# Patient Record
Sex: MALE | Race: WHITE | NOT HISPANIC OR LATINO | Employment: OTHER | URBAN - METROPOLITAN AREA
[De-identification: names, ages, dates, MRNs, and addresses within clinical notes are randomized per-mention and may not be internally consistent; named-entity substitution may affect disease eponyms.]

---

## 2017-10-06 ENCOUNTER — OFFICE VISIT (OUTPATIENT)
Dept: CARDIOLOGY | Facility: CLINIC | Age: 47
End: 2017-10-06
Payer: COMMERCIAL

## 2017-10-06 VITALS
HEART RATE: 85 BPM | DIASTOLIC BLOOD PRESSURE: 74 MMHG | WEIGHT: 232.38 LBS | BODY MASS INDEX: 33.27 KG/M2 | SYSTOLIC BLOOD PRESSURE: 113 MMHG | HEIGHT: 70 IN

## 2017-10-06 DIAGNOSIS — E78.00 HYPERCHOLESTEROLEMIA: ICD-10-CM

## 2017-10-06 DIAGNOSIS — I10 ESSENTIAL HYPERTENSION: Primary | ICD-10-CM

## 2017-10-06 DIAGNOSIS — Z72.0 TOBACCO USE: ICD-10-CM

## 2017-10-06 DIAGNOSIS — I10 ESSENTIAL HYPERTENSION: ICD-10-CM

## 2017-10-06 DIAGNOSIS — E11.9 TYPE 2 DIABETES MELLITUS WITHOUT COMPLICATION, WITHOUT LONG-TERM CURRENT USE OF INSULIN: ICD-10-CM

## 2017-10-06 DIAGNOSIS — R06.09 DOE (DYSPNEA ON EXERTION): ICD-10-CM

## 2017-10-06 DIAGNOSIS — I51.89 DIASTOLIC DYSFUNCTION: ICD-10-CM

## 2017-10-06 DIAGNOSIS — Z82.49 FAMILY HISTORY OF EARLY CAD: ICD-10-CM

## 2017-10-06 PROCEDURE — 99204 OFFICE O/P NEW MOD 45 MIN: CPT | Mod: S$GLB,,, | Performed by: INTERNAL MEDICINE

## 2017-10-06 PROCEDURE — 99999 PR PBB SHADOW E&M-EST. PATIENT-LVL IV: CPT | Mod: PBBFAC,,, | Performed by: INTERNAL MEDICINE

## 2017-10-06 RX ORDER — OLMESARTAN MEDOXOMIL 20 MG/1
20 TABLET ORAL DAILY
Qty: 90 TABLET | Refills: 0 | Status: SHIPPED | OUTPATIENT
Start: 2017-10-06 | End: 2017-12-08 | Stop reason: SDUPTHER

## 2017-10-06 RX ORDER — PANTOPRAZOLE SODIUM 40 MG/1
40 TABLET, DELAYED RELEASE ORAL DAILY PRN
Qty: 90 TABLET | Refills: 1 | OUTPATIENT
Start: 2017-10-06

## 2017-10-06 RX ORDER — PITAVASTATIN CALCIUM 4.18 MG/1
4 TABLET, FILM COATED ORAL DAILY
Qty: 90 TABLET | Refills: 0 | Status: SHIPPED | OUTPATIENT
Start: 2017-10-06 | End: 2017-12-08 | Stop reason: SDUPTHER

## 2017-10-06 RX ORDER — ICOSAPENT ETHYL 1000 MG/1
2 CAPSULE ORAL 2 TIMES DAILY
Qty: 180 CAPSULE | Refills: 0 | Status: SHIPPED | OUTPATIENT
Start: 2017-10-06 | End: 2017-10-26 | Stop reason: SDUPTHER

## 2017-10-06 RX ORDER — OLMESARTAN MEDOXOMIL 20 MG/1
20 TABLET ORAL DAILY
Qty: 90 TABLET | Refills: 0 | Status: SHIPPED | OUTPATIENT
Start: 2017-10-06 | End: 2017-10-06 | Stop reason: SDUPTHER

## 2017-10-06 RX ORDER — EZETIMIBE 10 MG/1
10 TABLET ORAL DAILY
Qty: 90 TABLET | Refills: 0 | Status: SHIPPED | OUTPATIENT
Start: 2017-10-06 | End: 2017-11-05 | Stop reason: SDUPTHER

## 2017-10-06 NOTE — PATIENT INSTRUCTIONS
"  Exercise to Manage Your Blood Sugar    Being physically active every day can help you manage your blood sugar. Thats because an active lifestyle can improve your bodys ability to use insulin. Daily activity can also help delay or prevent complications of diabetes. And its a great way to relieve stress. If you arent normally active, be sure to consult your healthcare provider before getting started.  How much activity do you need?  If daily activity is new to you, start slow and steady. Begin with 10 minutes of activity each day. Then work up to at least 150 minutes a week of physical activity. Don't let more than 2 days go by without being active. When sitting for long periods of time, get up for short sessions of light activity every 30 minutes  Just move!  You dont have to join a gym or own pricey sports equipment. Just get out and walk. Walking is an aerobic exercise that makes your heart and lungs work hard. It helps your heart and blood vessels. Walking needs only a sturdy pair of sneakers and your own two feet. The more you walk, the easier it gets:  · Schedule time every day to move your feet.  · Make it part of your daily routine.  · Walk with a friend or a group to keep it interesting and fun.  · Try taking several short walks during the day to meet your daily activity goal.  A pedometer makes every step count  A pedometer is a small device that keeps track of how many steps you take. You can clip it to your belt (or a strap on your arm or leg) and go about your daily routine. "Smartphones" now also have apps to record your walking. At the end of the day, the pedometer shows the total number of steps you took. Use a pedometer to set daily goals for yourself. For instance, if you walk 4,000 steps a day, try adding 200 more steps each day. Aim for a goal of 7,500. With every step, youre doing a little more to help your body use insulin.   Adding resistance exercise  Resistance exercise (also called " strength training), makes muscles stronger. It also helps muscles use insulin better. Ask your healthcare provider whether this type of exercise is right for you. If it is, your healthcare provider can help you work it in to your activity plan.  Staying safe  Being active may cause blood sugar to drop faster than usual. This is especially true if you take medicine to manage your blood sugar. But there are things you can do to help reduce the risk of accidental lows. Keep these tips in mind:  · Always carry identification when you exercise outside your home. Carry a cell phone to use in case of emergency.  · If you can, include friends and family in your activities.  · Wear a medical ID bracelet that says you have diabetes.  · Use the right safety equipment for the activity you do (such as a bicycle helmet when you ride a bicycle outdoors). Wear closed-toed shoes that fit your feet well.  · Drink plenty of water before and during activity.  · Keep a fast-acting sugar (such as glucose tablets) on hand in case of low blood sugar.  · Dress properly for the weather. Wear a hat if its [place], or wait until evening if its too hot.  · Avoid being active for long periods in very hot or very cold weather.  · Skip activity if youre sick.     Notice how activity affects blood sugar  Physical activity is important when you have diabetes. But you need to keep an eye on your blood sugar level. Check often if you have been active for longer than usual, or if the activity was unplanned. Make it a habit to check your blood sugar before being active. And check again a few hours later. Use your log book to write down how activity affects your numbers. If you take insulin, you may be able to adjust your dose before a planned activity. This can help prevent lows. You may also need to take a small carbohydrate snack before the exercise. Talk to your healthcare provider to learn more.    Date Last Reviewed: 6/1/2016  © 5019-1762 [employer]. [address] [place] [address], [place] [address]. All rights reserved. This information is not intended as a substitute for professional medical care. Always follow your healthcare professional's instructions.        Diabetes and Heart Disease     Take your medicines as directed each day, even if you feel fine.   If you have diabetes, you are two to four times more likely to have heart disease than someone without diabetes. This higher risk is due to diabetes, but it is also due to other risk factors for heart disease that happen in people with diabetes. But theres good news. You can help control your health risks by making some changes in your life. You can take steps to reduce your risk of heart disease by half--similar to the risk in people who don't have diabetes.  Your main risk factors  Three major risk factors for heart disease are high blood sugar, high blood pressure, and high levels of lipids. By keeping risk factors under control, you can help keep your heart and arteries healthy. This may reduce your chances of a heart attack.  · Blood sugar. High blood sugar can make artery walls tough and rough. Plaque (waxy material in the blood) can then build up along the artery walls, making it harder for blood to flow through the arteries. Having high blood sugar increases the chances of having high blood pressure and high cholesterol.  · Blood pressure. When blood pressure is high all the time it causes your heart to work harder to pump blood. Artery walls become damaged. This increases the risk for plaque build up.  · Lipids. The body needs some lipids in the blood to stay healthy. But lipid levels that are too high can damage the artery walls. Lipids include cholesterol and triglycerides. There are two kinds of cholesterol. LDL (bad) cholesterol can damage the arteries. But HDL (good) cholesterol helps clear LDL cholesterol from the blood vessels. This helps keep the arteries healthy. When  blood sugar is high, the level of triglycerides in the blood may also be high. High blood triglyceride levels can cause plaque to form.   Other risk factors  Certain lifestyle factors can increase levels of your blood sugar, blood pressure, and lipids. Such increases raise your risk of heart disease:  · Smoking damages the lining of your arteries. This allows plaque to build up in the artery walls. Smoking also constricts (narrows) the arteries. This can raise blood pressure and cause chest pain or angina. Smoking also increases your risk of getting type 2 diabetes.  · Not being active makes it harder for your heart to do its work. Inactivity is linked to many other risk factors, such as high blood pressure and poor cholesterol levels. Inactivity also increases your risk of getting type 2 diabetes.  · Being overweight makes it harder for your body to use insulin. It also makes your heart work too hard. Being overweight is also the main contributor to the development of type 2 diabetes,   Changes you can make  Following a few simple steps can help keep your risk factors under control. Work with your healthcare team to reach your goals.  · Quitting smoking could save your life. Smoking damages the lining of the blood vessels and raises blood pressure. Smoking also affects how your body uses insulin. This makes it harder to keep blood sugar under control. If you smoke and need help quitting, talk to your healthcare team.   · Testing your blood sugar is the only way to know whether it is under control. Be sure to test your blood sugar yourself. Also get your blood tested in the lab, as directed.  · Monitoring your blood pressure and lipid levels can help you achieve safe levels. Visit your healthcare team as scheduled.  · Taking medicines as directed can help control blood sugar, blood pressure, blood clotting, and/or cholesterol levels.  · Eating right can reduce your risk factors and help you lose weight. Try to limit  the amount of processed or refined carbohydrates you eat at one time. Cut back on your total calorie intake. Eat foods low in saturated fat and cholesterol. Eat fiber, including vegetables and whole grains, and cut down on salt. A dietitian or diabetes educator can help form a meal plan that works for you--even if you are on a low budget.   · Being active can help reduce your weight, strengthen your heart, and lower your lipid levels and blood pressure. Exercise and activity are good for your whole body. Talk to your healthcare team about increasing your activity safely over time.  · Keeping your appointments with your healthcare provider helps you stay healthy. Go in for checkups and lab tests as scheduled.  Date Last Reviewed: 5/19/2016 © 2000-2017 [employer]. [address] [place] [address], [place] [address]. All rights reserved. This information is not intended as a substitute for professional medical care. Always follow your healthcare professional's instructions.        Diabetes: The Benefits of Exercise     Take the stairs whenever you can.   Exercise can lower blood sugar, help control weight, and boost your mood. It can also improve blood flow, lower blood pressure, help you use oxygen more efficiently, and improve heart health. Even a small amount of regular activity can have a big impact on your health.  What can exercise help?  · Blood sugar. Regular exercise improves blood sugar control by helping your body use insulin.  · Mental and emotional health. Physical activity relieves stress and helps you sleep better.  · Heart health. With regular exercise, you can reduce your risk of heart disease and high blood pressure. You can also improve your cholesterol and triglyceride levels.  · Weight. Exercise helps you lose fat, gain muscle, and control your weight.  · Health of blood vessels and nerves. Activity helps lower blood sugar. This helps prevent damage to blood vessels and nerves that can cause  problems with your brain, eyes, feet, and legs.  · Finances. If you manage your blood sugar, you may spend less on medical care.  2 types of exercise  Two types of exercise help your body use blood sugar. Experts advise both types of exercise for people with diabetes:  · Aerobic exercise. This is a rhythmic, repeated, continued movement of large muscle groups for at least 10 minutes at a time. You should do this about 30 minutes a day on most days of the week. Examples include walking, bicycling, jogging, swimming, water aerobics, and many sports.  · Resistance exercise (strength training). This type of exercise uses muscles to move weight or work against resistance. You can do it with free weights, machines, resistance tubing, or your own body weight. Adults with diabetes should aim for 2 to 3 sessions of resistance exercise each week. Its best to skip a day in between.  A goal to shoot for  Your main goal is to become more active. Even a little bit helps. Choose an activity that you like. Walking is one great form of exercise that everyone can do. Talk to your healthcare provider about any limits you may have before starting with an exercise program. Then aim for 150 minutes a week of physical activity. Dont let more than 2 days go by without exercise. When you are sitting for long periods of time, get up for short sessions of light activity every 30 minutes.  Getting activity into your day  Being more active doesnt have to be hard work. Try these to get more activity into your day:  · Take the stairs instead of the elevator  · Garden, do housework, and yard work  · Choose a parking space farther from the store  · Walk to talk to a co-worker instead of calling  · Take a 10-minute walk around the block at lunch  · Walk to a bus stop a little farther from your home or office  · Walk the dog after dinner  Date Last Reviewed: 5/1/2016  © 0348-7385 [employer]. [address] [place] [address], [place]  [address]. All rights reserved. This information is not intended as a substitute for professional medical care. Always follow your healthcare professional's instructions.        Controlling High Blood Pressure  High blood pressure (hypertension) is often called the silent killer. This is because many people who have it dont know it. High blood pressure is defined as 140/90 mm Hg or higher. Know your blood pressure and remember to check it regularly. Doing so can save your life. Here are some things you can do to help control your blood pressure.    Choose heart-healthy foods  · Select low-salt, low-fat foods. Limit sodium intake to 2,400 mg per day or the amount suggested by your healthcare provider.  · Limit canned, dried, cured, packaged, and fast foods. These can contain a lot of salt.  · Eat 8 to 10 servings of fruits and vegetables every day.  · Choose lean meats, fish, or chicken.  · Eat whole-grain pasta, brown rice, and beans.  · Eat 2 to 3 servings of low-fat or fat-free dairy products.  · Ask your doctor about the DASH eating plan. This plan helps reduce blood pressure.  · When you go to a restaurant, ask that your meal be prepared with no added salt.  Maintain a healthy weight  · Ask your healthcare provider how many calories to eat a day. Then stick to that number.  · Ask your healthcare provider what weight range is healthiest for you. If you are overweight, a weight loss of only 3% to 5% of your body weight can help lower blood pressure. Generally, a good weight loss goal is to lose 10% of your body weight in a year.  · Limit snacks and sweets.  · Get regular exercise.  Get up and get active  · Choose activities you enjoy. Find ones you can do with friends or family. This includes bicycling, dancing, walking, and jogging.  · Park farther away from building entrances.  · Use stairs instead of the elevator.  · When you can, walk or bike instead of driving.  · [place] leaves, garden, or do household  repairs.  · Be active at a moderate to vigorous level of physical activity for at least 40 minutes for a minimum of 3 to 4 days a week.   Manage stress  · Make time to relax and enjoy life. Find time to laugh.  · Communicate your concerns with your loved ones and your healthcare provider.  · Visit with family and friends, and keep up with hobbies.  Limit alcohol and quit smoking  · Men should have no more than 2 drinks per day.  · Women should have no more than 1 drink per day.  · Talk with your healthcare provider about quitting smoking. Smoking significantly increases your risk for heart disease and stroke. Ask your healthcare provider about community smoking cessation programs and other options.  Medicines  If lifestyle changes arent enough, your healthcare provider may prescribe high blood pressure medicine. Take all medicines as prescribed. If you have any questions about your medicines, ask your healthcare provider before stopping or changing them.   Date Last Reviewed: 4/27/2016  © 7560-3115 [employer]. [address] [place] [address]. All rights reserved. This information is not intended as a substitute for professional medical care. Always follow your healthcare professional's instructions.        How to Quit Smoking  Smoking is one of the hardest habits to break. About half of all people who have ever smoked have been able to quit. Most people who still smoke want to quit. Here are some of the best ways to stop smoking.    Keep trying  Most smokers make many attempts at quitting before they are successful. Its important not to give up.  Go cold turkey  Most former smokers quit cold turkey (all at once). Trying to cut back gradually doesn't seem to work as well, perhaps because it continues the smoking habit. Also, it is possible to inhale more while smoking fewer cigarettes. This results in the same amount of nicotine in your body.  Get support  Support programs can be a big  help, especially for heavy smokers. These groups offer lectures, ways to change behavior, and peer support. Here are some ways to find a support program:  · Free national quitline: 800-QUIT-NOW (285-656-8095).  · Hospital quit-smoking programs.  · American Lung Association: (207.974.8555).  · American Cancer Society (236-843-8760).  Support at home is important too. Nonsmokers can offer praise and encouragement. If the smoker in your life finds it hard to quit, encourage them to keep trying.  Over-the-counter medicines  Nicotine replacement therapy may make quitting easier. Certain aids, such as the nicotine patch, gum, and lozenges, are available without a prescription. It is best to use these under a doctors care, though. The skin patch provides a steady supply of nicotine. Nicotine gum and lozenges give temporary bursts of low levels of nicotine. Both methods reduce the craving for cigarettes. Warning: If you have nausea, vomiting, dizziness, weakness, or a fast heartbeat, stop using these products and see your doctor.  Prescription medicines  After reviewing your smoking patterns and past attempts to quit, your doctor may offer a prescription medicine such as bupropion, varenicline, a nicotine inhaler, or nasal spray. Each has advantages and side effects. Your doctor can review these with you.  Health benefits of quitting  The benefits of quitting start right away and keep improving the longer you go without smoking. These benefits occur at any age.  So whether you are 17 or 70, quitting is a good decision. Some of the benefits include:  · 20 minutes: Blood pressure and pulse return to normal.  · 8 hours: Oxygen levels return to normal.  · 2 days: Ability to smell and taste begin to improve as damaged nerves regrow.  · 2 to 3 weeks: Circulation and lung function improve.  · 1 to 9 months: Coughing, congestion, and shortness of breath decrease; tiredness decreases.  · 1 year: Risk of heart attack decreases by  "half.  · 5 years: Risk of lung cancer decreases by half; risk of stroke becomes the same as a nonsmokers.  For more on how to quit smoking, try these online resources:   · Smokefree.gov  · "Clearing the Air" booklet from the National [employer] [place]: smokefree.gov/sites/default/files/pdf/clearing-the-air-accessible.pdf  Date Last Reviewed: 3/1/2017  © 4736-0349 [employer]. [address] [place], [place] [id number]. All rights reserved. This information is not intended as a substitute for professional medical care. Always follow your healthcare professional's instructions.        "

## 2017-10-06 NOTE — PROGRESS NOTES
Subjective:    Patient ID:  William Winslow is a 46 y.o. male who presents for Hypertension; Hyperlipidemia; and Shortness of Breath      HPI   PT WAS FOLLOWED AT [facility], NEW TO THIS CLINIC, H/O HTN, DYSLIPIDEMIA, SMOKING,NIDDM,  NO LABS, SEE ROS  Past Medical History:   Diagnosis Date    Chicken pox     GERD (gastroesophageal reflux disease)     Hyperlipidemia     Hypertension     Insomnia     Psoriasis     Sleep apnea     Stroke      Past Surgical History:   Procedure Laterality Date    MOUTH SURGERY      NASAL SEPTUM SURGERY       Family History   Problem Relation Age of Onset    Heart disease Father      Social History     Social History    Marital status:      Spouse name: N/A    Number of children: N/A    Years of education: N/A     Social History Main Topics    Smoking status: Current Every Day Smoker     Packs/day: 1.00     Types: Cigarettes    Smokeless tobacco: Never Used    Alcohol use No    Drug use: No    Sexual activity: Not Asked     Other Topics Concern    None     Social History Narrative    None       Review of patient's allergies indicates:  No Known Allergies    Current Outpatient Prescriptions:     ascorbic acid (VITAMIN C) 1000 MG tablet, Take 1,000 mg by mouth once daily., Disp: , Rfl:     aspirin 81 MG Chew, Take 81 mg by mouth once daily., Disp: , Rfl:     COQ10, LIPOSOMAL UBIQUINOL, ORAL, Take 1 capsule by mouth once daily., Disp: , Rfl:     escitalopram oxalate (LEXAPRO) 20 MG tablet, Take 1 tablet (20 mg total) by mouth once daily., Disp: 90 tablet, Rfl: 4    ezetimibe (ZETIA) 10 mg tablet, Take 1 tablet (10 mg total) by mouth once daily., Disp: 90 tablet, Rfl: 4    icosapent ethyl (VASCEPA) 1 gram Cap, Take 2 g by mouth 2 (two) times daily., Disp: 360 capsule, Rfl: 4    metformin (GLUCOPHAGE) 1000 MG tablet, Take 1 tablet (1,000 mg total) by mouth daily with breakfast., Disp: 90 tablet, Rfl: 4    olmesartan (BENICAR) 20 MG tablet, Take 1 tablet (20 mg  total) by mouth once daily., Disp: 90 tablet, Rfl: 4    pantoprazole (PROTONIX) 40 MG tablet, Take 1 tablet by mouth  every day as needed, Disp: 90 tablet, Rfl: 9    pitavastatin (LIVALO) 4 mg Tab, Take 4 mg by mouth Daily., Disp: 90 tablet, Rfl: 4    vitamin D 1000 units Tab, Take 185 mg by mouth once daily., Disp: , Rfl:     Review of Systems   Constitution: Negative for chills, diaphoresis, fever, weakness, malaise/fatigue and night sweats.   HENT: Negative for congestion and nosebleeds.    Eyes: Negative for blurred vision, discharge, double vision and visual disturbance.   Cardiovascular: Positive for dyspnea on exertion (MILD, SAME). Negative for chest pain, claudication, cyanosis, irregular heartbeat, leg swelling, near-syncope, orthopnea, palpitations, paroxysmal nocturnal dyspnea and syncope.   Respiratory: Negative for cough, hemoptysis, shortness of breath, sleep disturbances due to breathing, sputum production and wheezing.    Endocrine: Negative for cold intolerance, heat intolerance and polyuria.        BG ?   Hematologic/Lymphatic: Negative for adenopathy and bleeding problem. Does not bruise/bleed easily.   Skin: Negative for color change, itching and nail changes.        PSORIASIS   Musculoskeletal: Negative for back pain, falls and stiffness. Joint pain: SOME.   Gastrointestinal: Negative for abdominal pain, change in bowel habit, dysphagia, heartburn, hematemesis, jaundice, melena and vomiting.   Genitourinary: Negative for dysuria, flank pain, frequency and nocturia.   Neurological: Negative for brief paralysis, difficulty with concentration, disturbances in coordination, dizziness, focal weakness, light-headedness, loss of balance, numbness, paresthesias, seizures, sensory change and tremors.   Psychiatric/Behavioral: Negative for altered mental status, depression, memory loss and substance abuse. The patient is not nervous/anxious.    Allergic/Immunologic: Negative for persistent infections.  "       Objective:      Vitals:    10/06/17 0806   BP: 113/74   Pulse: 85   Weight: 105.4 kg (232 lb 5.8 oz)   Height: 5' 10" (1.778 m)   PainSc: 0-No pain     Body mass index is 33.34 kg/m².    Physical Exam   Constitutional: He is oriented to person, place, and time. He appears well-developed and well-nourished. He is active.   HENT:   Head: Normocephalic and atraumatic.   Mouth/Throat: Oropharynx is clear and moist and mucous membranes are normal.   Eyes: Conjunctivae and EOM are normal. Pupils are equal, round, and reactive to light.   Neck: Normal range of motion. Neck supple. Normal carotid pulses, no hepatojugular reflux and no JVD present. Carotid bruit is not present. No tracheal deviation, no edema and no erythema present. No thyromegaly present.   Cardiovascular: Normal rate and regular rhythm.   No extrasystoles are present. PMI is not displaced.  Exam reveals no gallop, no distant heart sounds, no friction rub and no midsystolic click.    Murmur heard.   Systolic murmur is present with a grade of 1/6  at the lower left sternal border  Pulses:       Carotid pulses are 2+ on the right side, and 2+ on the left side.       Radial pulses are 2+ on the right side, and 2+ on the left side.        Femoral pulses are 2+ on the right side, and 2+ on the left side.       Dorsalis pedis pulses are 2+ on the right side, and 2+ on the left side.        Posterior tibial pulses are 2+ on the right side, and 2+ on the left side.   Pulmonary/Chest: Effort normal and breath sounds normal. No accessory muscle usage. No tachypnea and no bradypnea. No respiratory distress.   Abdominal: Soft. Bowel sounds are normal. He exhibits no distension and no mass. There is no hepatosplenomegaly. There is no tenderness. There is no CVA tenderness.   Musculoskeletal: Normal range of motion. He exhibits no edema or deformity.   Lymphadenopathy:     He has no cervical adenopathy.   Neurological: He is alert and oriented to person, place, " and time. He has normal strength. He displays no tremor. No cranial nerve deficit. He exhibits normal muscle tone. Coordination normal.   Skin: Skin is warm and dry. No cyanosis or erythema. No pallor.   Psychiatric: He has a normal mood and affect. His speech is normal and behavior is normal. Judgment and thought content normal.               ..    Chemistry        Component Value Date/Time     12/05/2016 0715    K 4.7 12/05/2016 0715     12/05/2016 0715    CO2 26 12/05/2016 0715    BUN 13 12/05/2016 0715    CREATININE 0.81 12/05/2016 0715     (H) 12/05/2016 0715        Component Value Date/Time    CALCIUM 9.1 12/05/2016 0715    ALKPHOS 61 12/05/2016 0715    AST 30 12/05/2016 0715    AST 33 12/02/2015 0716    ALT 59 (H) 12/05/2016 0715    BILITOT 0.4 12/05/2016 0715    ESTGFRAFRICA >60 12/05/2016 0715    EGFRNONAA >60 12/05/2016 0715            ..  Lab Results   Component Value Date    CHOL 134 12/05/2016    CHOL 121 10/27/2016    CHOL 195 12/02/2015     Lab Results   Component Value Date    HDL 41 12/05/2016    HDL 37 (L) 10/27/2016    HDL 38 (L) 12/02/2015     Lab Results   Component Value Date    LDLCALC 72.6 12/05/2016    LDLCALC 63.2 10/27/2016    LDLCALC 126.4 12/02/2015     Lab Results   Component Value Date    TRIG 102 12/05/2016    TRIG 104 10/27/2016    TRIG 153 (H) 12/02/2015     Lab Results   Component Value Date    CHOLHDL 30.6 12/05/2016    CHOLHDL 30.6 10/27/2016    CHOLHDL 19.5 (L) 12/02/2015     ..  Lab Results   Component Value Date    WBC 7.62 12/05/2016    HGB 16.0 12/05/2016    HCT 48.0 12/05/2016    MCV 92 12/05/2016     12/05/2016       Test(s) Reviewed  I have reviewed the following in detail:  [] Stress test   [] Angiography   [] Echocardiogram   [] Labs   [x] Other:       Assessment:         ICD-10-CM ICD-9-CM   1. Essential hypertension I10 401.9   2. Diastolic dysfunction I51.9 429.9   3. GARCIA (dyspnea on exertion) R06.09 786.09   4. Hypercholesterolemia E78.00  272.0   5. Tobacco use Z72.0 305.1   6. Type 2 diabetes mellitus without complication, without long-term current use of insulin E11.9 250.00   7. Family history of early CAD Z82.49 V17.3     Problem List Items Addressed This Visit        Cardiac/Vascular    Essential hypertension - Primary    Relevant Orders    Comprehensive metabolic panel    Exercise stress echo with color flow    EKG 12-LEAD - [place]    CT Cardiac Scoring    Diastolic dysfunction    Relevant Orders    Comprehensive metabolic panel    Exercise stress echo with color flow    EKG 12-LEAD - [facility]    Hypercholesterolemia    Relevant Orders    Comprehensive metabolic panel    Exercise stress echo with color flow    EKG 12-LEAD - [facility]       Endocrine    Type 2 diabetes mellitus without complication    Relevant Orders    Comprehensive metabolic panel    Exercise stress echo with color flow    EKG 12-LEAD - [place]       Other    GARCIA (dyspnea on exertion)    Relevant Orders    Comprehensive metabolic panel    Exercise stress echo with color flow    EKG 12-LEAD - [facility]    Tobacco use    Relevant Orders    Ambulatory referral to Smoking Cessation Program    Comprehensive metabolic panel    Lipid panel    Exercise stress echo with color flow    EKG 12-LEAD - [place]      Other Visit Diagnoses     Family history of early CAD               Plan:     WILL CHECK STRESS ECHO, CA SCORE, TOBACCO CESSATION/REFERRAL, ALL OTHER CV CLINICALLY STABLE,  NO HF, NO TIA, NO CLINICAL ARRHYTHMIA,CONTINUE CURRENT MEDS, EDUCATION, DIET, EXERCISE, WEIGHT LOSS, RTC IN 2 MO, INCREASED CV RISK WITH DM/TOBACCO      Essential hypertension  -     Comprehensive metabolic panel; Future; Expected date: 10/06/2017  -     Exercise stress echo with color flow; Future  -     EKG 12-LEAD - [facility]; Future  -     CT Cardiac Scoring; Future; Expected date: 10/06/2017    Diastolic dysfunction  -     Comprehensive metabolic panel; Future; Expected date: 10/06/2017  -     Exercise stress echo with color flow;  Future  -     EKG 12-LEAD - [facility]; Future    GARCIA (dyspnea on exertion)  -     Comprehensive metabolic panel; Future; Expected date: 10/06/2017  -     Exercise stress echo with color flow; Future  -     EKG 12-LEAD - [facility]; Future    Hypercholesterolemia  -     Comprehensive metabolic panel; Future; Expected date: 10/06/2017  -     Exercise stress echo with color flow; Future  -     EKG 12-LEAD - [facility]; Future    Tobacco use  -     Ambulatory referral to Smoking Cessation Program  -     Comprehensive metabolic panel; Future; Expected date: 10/06/2017  -     Lipid panel; Future; Expected date: 10/06/2017  -     Exercise stress echo with color flow; Future  -     EKG 12-LEAD - [facility]; Future    Type 2 diabetes mellitus without complication, without long-term current use of insulin  -     Comprehensive metabolic panel; Future; Expected date: 10/06/2017  -     Exercise stress echo with color flow; Future  -     EKG 12-LEAD - [facility]; Future  -     Cancel: Hemoglobin A1c; Future; Expected date: 10/06/2017    Family history of early CAD    RTC Low level/low impact aerobic exercise 5x's/wk. Heart healthy diet and risk factor modification.    See labs and med orders.    Aerobic exercise 5x's/wk. Heart healthy diet and risk factor modification.    See labs and med orders.

## 2017-10-26 RX ORDER — ICOSAPENT ETHYL 1000 MG/1
CAPSULE ORAL
Qty: 180 CAPSULE | Refills: 0 | Status: SHIPPED | OUTPATIENT
Start: 2017-10-26 | End: 2017-12-08 | Stop reason: SDUPTHER

## 2017-10-30 ENCOUNTER — HOSPITAL ENCOUNTER (OUTPATIENT)
Dept: RADIOLOGY | Facility: HOSPITAL | Age: 47
Discharge: HOME | End: 2017-10-30
Attending: INTERNAL MEDICINE
Payer: COMMERCIAL

## 2017-10-30 ENCOUNTER — TELEPHONE (OUTPATIENT)
Dept: CARDIOLOGY | Facility: CLINIC | Age: 47
End: 2017-10-30

## 2017-10-30 ENCOUNTER — CLINICAL SUPPORT (OUTPATIENT)
Dept: CARDIOLOGY | Facility: CLINIC | Age: 47
End: 2017-10-30
Payer: COMMERCIAL

## 2017-10-30 DIAGNOSIS — I51.89 DIASTOLIC DYSFUNCTION: ICD-10-CM

## 2017-10-30 DIAGNOSIS — E78.00 HYPERCHOLESTEROLEMIA: ICD-10-CM

## 2017-10-30 DIAGNOSIS — I10 ESSENTIAL HYPERTENSION: ICD-10-CM

## 2017-10-30 DIAGNOSIS — R06.09 DOE (DYSPNEA ON EXERTION): ICD-10-CM

## 2017-10-30 DIAGNOSIS — E11.9 TYPE 2 DIABETES MELLITUS WITHOUT COMPLICATION, WITHOUT LONG-TERM CURRENT USE OF INSULIN: ICD-10-CM

## 2017-10-30 DIAGNOSIS — Z72.0 TOBACCO USE: ICD-10-CM

## 2017-10-30 LAB
ESTIMATED PA SYSTOLIC PRESSURE: 43.45
MITRAL VALVE REGURGITATION: ABNORMAL
RETIRED EF AND QEF - SEE NOTES: 65 (ref 55–65)
TRICUSPID VALVE REGURGITATION: ABNORMAL

## 2017-10-30 PROCEDURE — 75571 CT HRT W/O DYE W/CA TEST: CPT | Mod: TC,PO

## 2017-10-30 PROCEDURE — 93320 DOPPLER ECHO COMPLETE: CPT | Mod: S$GLB,,, | Performed by: INTERNAL MEDICINE

## 2017-10-30 PROCEDURE — 93325 DOPPLER ECHO COLOR FLOW MAPG: CPT | Mod: S$GLB,,, | Performed by: INTERNAL MEDICINE

## 2017-10-30 PROCEDURE — 75571 CT HRT W/O DYE W/CA TEST: CPT | Mod: 26,,, | Performed by: RADIOLOGY

## 2017-10-30 PROCEDURE — 93351 STRESS TTE COMPLETE: CPT | Mod: S$GLB,,, | Performed by: INTERNAL MEDICINE

## 2017-11-05 RX ORDER — EZETIMIBE 10 MG/1
TABLET ORAL
Qty: 90 TABLET | Refills: 0 | Status: SHIPPED | OUTPATIENT
Start: 2017-11-05 | End: 2017-12-01 | Stop reason: ALTCHOICE

## 2017-12-01 ENCOUNTER — OFFICE VISIT (OUTPATIENT)
Dept: CARDIOLOGY | Facility: CLINIC | Age: 47
End: 2017-12-01
Payer: COMMERCIAL

## 2017-12-01 VITALS
SYSTOLIC BLOOD PRESSURE: 109 MMHG | BODY MASS INDEX: 33.46 KG/M2 | HEART RATE: 75 BPM | DIASTOLIC BLOOD PRESSURE: 74 MMHG | HEIGHT: 70 IN | WEIGHT: 233.69 LBS

## 2017-12-01 DIAGNOSIS — I10 ESSENTIAL HYPERTENSION: Primary | ICD-10-CM

## 2017-12-01 DIAGNOSIS — R74.01 ELEVATED ALT MEASUREMENT: ICD-10-CM

## 2017-12-01 DIAGNOSIS — E78.00 HYPERCHOLESTEROLEMIA: ICD-10-CM

## 2017-12-01 DIAGNOSIS — E66.9 OBESITY, CLASS I, BMI 30-34.9: ICD-10-CM

## 2017-12-01 DIAGNOSIS — I27.20 PULMONARY HYPERTENSION: ICD-10-CM

## 2017-12-01 DIAGNOSIS — R93.1 AGATSTON CAC SCORE, <100: ICD-10-CM

## 2017-12-01 DIAGNOSIS — Z72.0 TOBACCO USE: ICD-10-CM

## 2017-12-01 PROCEDURE — 99999 PR PBB SHADOW E&M-EST. PATIENT-LVL IV: CPT | Mod: PBBFAC,,, | Performed by: INTERNAL MEDICINE

## 2017-12-01 PROCEDURE — 99213 OFFICE O/P EST LOW 20 MIN: CPT | Mod: S$GLB,,, | Performed by: INTERNAL MEDICINE

## 2017-12-01 NOTE — PATIENT INSTRUCTIONS
Diabetes and Heart Disease     Take your medicines as directed each day, even if you feel fine.   If you have diabetes, you are two to four times more likely to have heart disease than someone without diabetes. This higher risk is due to diabetes, but it is also due to other risk factors for heart disease that happen in people with diabetes. But theres good news. You can help control your health risks by making some changes in your life. You can take steps to reduce your risk of heart disease by half--similar to the risk in people who don't have diabetes.  Your main risk factors  Three major risk factors for heart disease are high blood sugar, high blood pressure, and high levels of lipids. By keeping risk factors under control, you can help keep your heart and arteries healthy. This may reduce your chances of a heart attack.  · Blood sugar. High blood sugar can make artery walls tough and rough. Plaque (waxy material in the blood) can then build up along the artery walls, making it harder for blood to flow through the arteries. Having high blood sugar increases the chances of having high blood pressure and high cholesterol.  · Blood pressure. When blood pressure is high all the time it causes your heart to work harder to pump blood. Artery walls become damaged. This increases the risk for plaque build up.  · Lipids. The body needs some lipids in the blood to stay healthy. But lipid levels that are too high can damage the artery walls. Lipids include cholesterol and triglycerides. There are two kinds of cholesterol. LDL (bad) cholesterol can damage the arteries. But HDL (good) cholesterol helps clear LDL cholesterol from the blood vessels. This helps keep the arteries healthy. When blood sugar is high, the level of triglycerides in the blood may also be high. High blood triglyceride levels can cause plaque to form.   Other risk factors  Certain lifestyle factors can increase levels of your blood sugar, blood  pressure, and lipids. Such increases raise your risk of heart disease:  · Smoking damages the lining of your arteries. This allows plaque to build up in the artery walls. Smoking also constricts (narrows) the arteries. This can raise blood pressure and cause chest pain or angina. Smoking also increases your risk of getting type 2 diabetes.  · Not being active makes it harder for your heart to do its work. Inactivity is linked to many other risk factors, such as high blood pressure and poor cholesterol levels. Inactivity also increases your risk of getting type 2 diabetes.  · Being overweight makes it harder for your body to use insulin. It also makes your heart work too hard. Being overweight is also the main contributor to the development of type 2 diabetes,   Changes you can make  Following a few simple steps can help keep your risk factors under control. Work with your healthcare team to reach your goals.  · Quitting smoking could save your life. Smoking damages the lining of the blood vessels and raises blood pressure. Smoking also affects how your body uses insulin. This makes it harder to keep blood sugar under control. If you smoke and need help quitting, talk to your healthcare team.   · Testing your blood sugar is the only way to know whether it is under control. Be sure to test your blood sugar yourself. Also get your blood tested in the lab, as directed.  · Monitoring your blood pressure and lipid levels can help you achieve safe levels. Visit your healthcare team as scheduled.  · Taking medicines as directed can help control blood sugar, blood pressure, blood clotting, and/or cholesterol levels.  · Eating right can reduce your risk factors and help you lose weight. Try to limit the amount of processed or refined carbohydrates you eat at one time. Cut back on your total calorie intake. Eat foods low in saturated fat and cholesterol. Eat fiber, including vegetables and whole grains, and cut down on salt. A  dietitian or diabetes educator can help form a meal plan that works for you--even if you are on a low budget.   · Being active can help reduce your weight, strengthen your heart, and lower your lipid levels and blood pressure. Exercise and activity are good for your whole body. Talk to your healthcare team about increasing your activity safely over time.  · Keeping your appointments with your healthcare provider helps you stay healthy. Go in for checkups and lab tests as scheduled.  Date Last Reviewed: 5/19/2016  © 3580-7030 [employer]. [address] [place] [address]. All rights reserved. This information is not intended as a substitute for professional medical care. Always follow your healthcare professional's instructions.        Heart Disease Education    The heart beats 60 to 100 times per minute, 24 hours a day. This equals almost 1000,000 times a day. It pumps blood with oxygen and nutrients to the tissues and organs of the body. But the heart is a muscle and needs its own supply of blood. Blood flow to the heart is supplied by the coronary arteries. Coronary artery disease (atherosclerosis) is a result of cholesterol, saturated fat, and calcium deposits (plaques) that build up inside the walls. This causes inflammation within the coronary arteries. These plaques narrow the artery and reduce blood flow to the heart muscle. The reduction in blood flow to the heart muscle decreases oxygen supply to the heart. If the narrowing is significant enough, the oxygen supply to one or more regions of the heart can be temporarily or permanently shut down. This can cause chest pain, and possibly death of heart tissue (heart attack).  Types of chest pain  Angina is the name for pain in the heart muscle. Angina is a warning sign of serious heart disease. When untreated it can lead to a heart attack, also known as acute myocardial infarction, or AMI. Angina occurs when there is not enough blood and  oxygen flowing to the heart for the amount of work it is doing. This most often happens during physical exertion, when the heart is working hardest. It is usually relieved by rest or nitroglycerin. Angina may also occur after a large meal when extra blood is sent to the digestive organs and less goes to the heart. In the case of advanced or unstable heart disease, angina can occur at rest or awaken you from sleep. Angina usually lasts from a few minutes up to 20 minutes or more. When treated early, the effects of angina can be reversed without permanent damage to the heart. Angina is a serious condition and needs to be evaluated by a medical professional immediately.  There are two types of angina -- stable and unstable:  · Stable angina usually occurs with a predictable level of activity. Being stable, its character, severity, and occurrence do not change much over time. It usually starts with activity, and resolves with rest or taking your medicine as instructed by your doctor. The symptoms usually do not last long.  · Unstable angina changes or gets worse over time. It is different from whatever you are used to. It may feel different or worse, begin without cause, occur with exercise or exertion, wake you up from sleep, and last longer. It may not respond in the same way as it does when you take your usual medicines for an attack. This type of angina can be a warning sign of an impending heart attack.     A heart attack is usually the result of a blood clot that suddenly forms in a coronary artery that has been narrowed with plaque. When this occurs, blood flow may be cut off to a part of the heart muscle, causing the cells to die. This weakens the pumping action of the heart, which affects the delivery of blood to all the other organs in the body including the brain. This damage is not reversible. However, early treatment can limit the amount of damage.  The pain you feel with angina and a heart attack may have  "a similar quality. However, it is usually different in intensity and duration. Here are some typical descriptions of a heart attack:  · It is most often experienced as a squeezing, crushing, pressure-like sensation in the center of the chest.  · It is sometimes described as something heavy sitting on my chest.  · It may feel more like a bad case of indigestion.  · The pain may spread from the chest to the arm, shoulder, throat or jaw.  · Sometimes the pain is not felt in the chest at all, but only in the arm, shoulder, throat or jaw.  · There may also be nausea, vomiting, dizziness or light-headedness, sweating and trouble breathing.  · Palpitations, or your heart beating rapidly  · A new, irregular heart beat  · Unexplained weakness  You may not be able to tell the difference between "bad" angina and a heart attack at home. Seek help if your symptoms are different than usual. Do not be in denial or just try to "tough it out."  Call 911  This is the fastest and safest way to get to the emergency department. The paramedics can also start treatment on the way to the hospital, saving valuable time for your heart.  · If the angina gets worse, if it continues, or if it stops and returns, call 911 immediately. Do not delay. You may be having a heart attack.  · After you call 911, take a second tablet or spray unless instructed otherwise. When repeating doses, sit down if possible, because it can make you feel lightheaded or dizzy. Wait another 5 minutes. If the angina still does not go away, take a third tablet or spray. Do not take more than 3 tablets or sprays within 15 minutes. Stay on the phone with 911 for further instruction.  · Your healthcare provider may give you slightly different instructions than those above. If so, follow them carefully.  Do not wait until symptoms become severe to call 911.  Other reasons to call 911 include:  · Trouble breathing  · Feeling lightheaded, faint, or dizzy  · Rapid heart " "beat  · Slower than usual heart rate compared to your normal  · Angina with weakness, dizziness, fainting, heavy sweating, nausea, or vomiting  · Extreme drowsiness, confusion  · Weakness of an arm or leg or one side of the face  · Difficulty with speech or vision  When to seek medical care  Remember, the signs and symptoms of a heart attack are not always like they are on TV. Sometimes they are not so obvious. You may only feel weak, or just not right. If it is not clear or if you have any doubt, call for advice.  · Seek help if there is a change in the type of pain, if it feels different, or if your symptoms are mild.  · Do not drive yourself. Have someone else drive you. If no one can drive, call 911.  · Do not delay. Fast diagnosis and treatment can prevent or limit the amount of heart damage during a heart attack.  · Do not go to your doctor's office or a clinic as they may not be able to provide all the testing and treatment required for this condition.  · If your doctor has given you medicine to take when symptoms occur, take them but don't delay getting help trying to locate medicines.  What happens in the emergency department  The emergency department is connected to your local emergency medical system (EMS) through 911. That's why during a cardiac emergency, calling 911 is the fastest way to get help. The goal of the emergency department is to rapidly screen, evaluate, and treat people.  Once you are there, an electrocardiogram (ECG or heart tracing) will be done. Blood samples may be taken to look for the presence of heart enzymes that leak from damaged heart cells and show if a heart attack is occurring. You will often be evaluated by a heart specialist (cardiologist) who decides the best course of action. In the case of severe angina or early heart attack, and depending on the circumstances, powerful "clot busting" medicines can be used to dissolve blood clots in the coronary artery. In other cases, you " may be taken to a cardiac catheterization lab. Here, a tiny balloon-tipped catheter is advanced through blood vessels to the heart. There the balloon is inflated pushing open the blood vessel restoring blood flow.  Risk factors for heart disease  Risk factors for heart disease are a combination of genetic and lifestyle. Many risk factors work by either directly or indirectly damaging the blood vessels of the heart, or by increasing the risk of forming blood or cholesterol clots, which then clog up and block the arteries.     Examples of physical lifestyle risk factors:  · Cigarette smoking  · High blood pressure  · High blood cholesterol  · Use of stimulant drugs such as cocaine, crack, and amphetamines  · Eating a high-fat, high-cholesterol meal  · Diabetes   · Obesity which increases risk for diabetes and high blood pressure  · Lack of regular physical activity     Examples of emotional lifestyle factors:  · Chronic high stress levels release stress hormones. These raise blood pressure and cholesterol level and makes blood clot more easily.  · Held-in anger, hostile or cynical attitude  · Social and emotional isolation, lack of intimacy  · Loss of relationship  · Depression  Other factors that increase the risk of heart attack that you cannot control :  · Age. The older you get beyond 40, the greater is your risk of significant coronary artery disease.  · Gender. More men than women get heart disease; but once past menopause, women who are not taking estrogen replacement have the same risk as men for a heart attack.  · Family history. If your mother, father, brother or sister has coronary artery disease, your risk of having it is higher than a person your age without this family history.  What can you do to decrease your risk  To reduce your risk of heart disease:  · Get regular checkups with your doctor.  · Take your medicines for blood pressure, cholesterol or diabetes as directed.  · Watch your diet. Eat a  heart healthy diet choosing fresh foods, less salt, cholesterol, and fat  · Stop smoking. Get help if needed.  · Get regular exercise.  · Manage stress.  · Carry a list of medicines and doses in your wallet.  Date Last Reviewed: 12/30/2015  © 7736-0608 [employer]. [address] [place] [address]. All rights reserved. This information is not intended as a substitute for professional medical care. Always follow your healthcare professional's instructions.        Controlling High Blood Pressure  High blood pressure (hypertension) is often called the silent killer. This is because many people who have it dont know it. High blood pressure is defined as 140/90 mm Hg or higher. Know your blood pressure and remember to check it regularly. Doing so can save your life. Here are some things you can do to help control your blood pressure.    Choose heart-healthy foods  · Select low-salt, low-fat foods. Limit sodium intake to 2,400 mg per day or the amount suggested by your healthcare provider.  · Limit canned, dried, cured, packaged, and fast foods. These can contain a lot of salt.  · Eat 8 to 10 servings of fruits and vegetables every day.  · Choose lean meats, fish, or chicken.  · Eat whole-grain pasta, brown rice, and beans.  · Eat 2 to 3 servings of low-fat or fat-free dairy products.  · Ask your doctor about the DASH eating plan. This plan helps reduce blood pressure.  · When you go to a restaurant, ask that your meal be prepared with no added salt.  Maintain a healthy weight  · Ask your healthcare provider how many calories to eat a day. Then stick to that number.  · Ask your healthcare provider what weight range is healthiest for you. If you are overweight, a weight loss of only 3% to 5% of your body weight can help lower blood pressure. Generally, a good weight loss goal is to lose 10% of your body weight in a year.  · Limit snacks and sweets.  · Get regular exercise.  Get up and get  active  · Choose activities you enjoy. Find ones you can do with friends or family. This includes bicycling, dancing, walking, and jogging.  · Park farther away from building entrances.  · Use stairs instead of the elevator.  · When you can, walk or bike instead of driving.  · [place] leaves, garden, or do household repairs.  · Be active at a moderate to vigorous level of physical activity for at least 40 minutes for a minimum of 3 to 4 days a week.   Manage stress  · Make time to relax and enjoy life. Find time to laugh.  · Communicate your concerns with your loved ones and your healthcare provider.  · Visit with family and friends, and keep up with hobbies.  Limit alcohol and quit smoking  · Men should have no more than 2 drinks per day.  · Women should have no more than 1 drink per day.  · Talk with your healthcare provider about quitting smoking. Smoking significantly increases your risk for heart disease and stroke. Ask your healthcare provider about community smoking cessation programs and other options.  Medicines  If lifestyle changes arent enough, your healthcare provider may prescribe high blood pressure medicine. Take all medicines as prescribed. If you have any questions about your medicines, ask your healthcare provider before stopping or changing them.   Date Last Reviewed: 4/27/2016  © 2505-6610 [employer]. [address] [place] [address], [place] [address]. All rights reserved. This information is not intended as a substitute for professional medical care. Always follow your healthcare professional's instructions.        How to Quit Smoking  Smoking is one of the hardest habits to break. About half of all people who have ever smoked have been able to quit. Most people who still smoke want to quit. Here are some of the best ways to stop smoking.    Keep trying  Most smokers make many attempts at quitting before they are successful. Its important not to give up.  Go cold turkey  Most former  smokers quit cold turkey (all at once). Trying to cut back gradually doesn't seem to work as well, perhaps because it continues the smoking habit. Also, it is possible to inhale more while smoking fewer cigarettes. This results in the same amount of nicotine in your body.  Get support  Support programs can be a big help, especially for heavy smokers. These groups offer lectures, ways to change behavior, and peer support. Here are some ways to find a support program:  · Free national quitline: 800-QUIT-NOW (795-533-0419).  · Hospital quit-smoking programs.  · American Lung Association: (127.690.8204).  · American Cancer Society (006-258-2404).  Support at home is important too. Nonsmokers can offer praise and encouragement. If the smoker in your life finds it hard to quit, encourage them to keep trying.  Over-the-counter medicines  Nicotine replacement therapy may make quitting easier. Certain aids, such as the nicotine patch, gum, and lozenges, are available without a prescription. It is best to use these under a doctors care, though. The skin patch provides a steady supply of nicotine. Nicotine gum and lozenges give temporary bursts of low levels of nicotine. Both methods reduce the craving for cigarettes. Warning: If you have nausea, vomiting, dizziness, weakness, or a fast heartbeat, stop using these products and see your doctor.  Prescription medicines  After reviewing your smoking patterns and past attempts to quit, your doctor may offer a prescription medicine such as bupropion, varenicline, a nicotine inhaler, or nasal spray. Each has advantages and side effects. Your doctor can review these with you.  Health benefits of quitting  The benefits of quitting start right away and keep improving the longer you go without smoking. These benefits occur at any age.  So whether you are 17 or 70, quitting is a good decision. Some of the benefits include:  · 20 minutes: Blood pressure and pulse return to normal.  · 8  "hours: Oxygen levels return to normal.  · 2 days: Ability to smell and taste begin to improve as damaged nerves regrow.  · 2 to 3 weeks: Circulation and lung function improve.  · 1 to 9 months: Coughing, congestion, and shortness of breath decrease; tiredness decreases.  · 1 year: Risk of heart attack decreases by half.  · 5 years: Risk of lung cancer decreases by half; risk of stroke becomes the same as a nonsmokers.  For more on how to quit smoking, try these online resources:   · Smokefree.gov  · "Clearing the Air" booklet from the National Cancer [place]: smokefree.gov/sites/default/files/pdf/clearing-the-air-accessible.pdf  Date Last Reviewed: 3/1/2017  © 3701-4994 [employer]. [address] [place] [address], [place] [address]. All rights reserved. This information is not intended as a substitute for professional medical care. Always follow your healthcare professional's instructions.        "

## 2017-12-01 NOTE — PROGRESS NOTES
Subjective:    Patient ID:  William Winslow is a 47 y.o. male who presents for Hypertension (Echo, Labs, Stress)        HPI  DISCUSSED LABS AND GOALS, LDL 75, HDL 38, , NEGATIVE STRESS ECHO, PAP 43,CA SCORE 17,  DOING OK, SEE ROS    Past Medical History:   Diagnosis Date    Chicken pox     GERD (gastroesophageal reflux disease)     Hyperlipidemia     Hypertension     Insomnia     Psoriasis     Sleep apnea     Stroke      Past Surgical History:   Procedure Laterality Date    MOUTH SURGERY      NASAL SEPTUM SURGERY       Family History   Problem Relation Age of Onset    Heart disease Father      Social History     Social History    Marital status:      Spouse name: N/A    Number of children: N/A    Years of education: N/A     Social History Main Topics    Smoking status: Current Every Day Smoker     Packs/day: 1.00     Types: Cigarettes    Smokeless tobacco: Never Used    Alcohol use No    Drug use: No    Sexual activity: Not Asked     Other Topics Concern    None     Social History Narrative    None       Review of patient's allergies indicates:  No Known Allergies    Current Outpatient Prescriptions:     ascorbic acid (VITAMIN C) 1000 MG tablet, Take 1,000 mg by mouth once daily., Disp: , Rfl:     aspirin 81 MG Chew, Take 81 mg by mouth once daily., Disp: , Rfl:     COQ10, LIPOSOMAL UBIQUINOL, ORAL, Take 1 capsule by mouth once daily., Disp: , Rfl:     escitalopram oxalate (LEXAPRO) 20 MG tablet, Take 1 tablet (20 mg total) by mouth once daily., Disp: 90 tablet, Rfl: 4    metformin (GLUCOPHAGE) 1000 MG tablet, Take 1 tablet (1,000 mg total) by mouth daily with breakfast., Disp: 90 tablet, Rfl: 4    olmesartan (BENICAR) 20 MG tablet, Take 1 tablet (20 mg total) by mouth once daily., Disp: 90 tablet, Rfl: 0    pantoprazole (PROTONIX) 40 MG tablet, Take 1 tablet by mouth  every day as needed, Disp: 90 tablet, Rfl: 9    pitavastatin calcium (LIVALO) 4 mg Tab, Take 4 mg by  "mouth Daily., Disp: 90 tablet, Rfl: 0    VASCEPA 1 gram Cap, TAKE 2 CAPSULES BY MOUTH  TWICE DAILY, Disp: 180 capsule, Rfl: 0    vitamin D 1000 units Tab, Take 185 mg by mouth once daily., Disp: , Rfl:     Review of Systems   Constitution: Negative for chills, diaphoresis, fever, weakness, malaise/fatigue and night sweats.   HENT: Negative for congestion and nosebleeds.    Eyes: Negative for blurred vision, discharge, double vision and visual disturbance.   Cardiovascular: Negative for chest pain, claudication, cyanosis, irregular heartbeat, leg swelling, near-syncope, orthopnea, palpitations, paroxysmal nocturnal dyspnea and syncope. Dyspnea on exertion: MINIMAL.   Respiratory: Negative for cough, hemoptysis, shortness of breath, sleep disturbances due to breathing, sputum production and wheezing.    Endocrine: Negative for cold intolerance, heat intolerance and polyuria.        BG ?   Hematologic/Lymphatic: Negative for adenopathy and bleeding problem. Does not bruise/bleed easily.   Skin: Negative for color change, itching and nail changes.        PSORIASIS   Musculoskeletal: Negative for back pain, falls and stiffness. Joint pain: SOME.   Gastrointestinal: Negative for abdominal pain, change in bowel habit, dysphagia, heartburn, hematemesis, jaundice, melena and vomiting.   Genitourinary: Negative for dysuria, flank pain, frequency and nocturia.   Neurological: Negative for brief paralysis, difficulty with concentration, disturbances in coordination, dizziness, focal weakness, light-headedness, loss of balance, numbness, paresthesias, seizures, sensory change and tremors.   Psychiatric/Behavioral: Negative for altered mental status, depression, memory loss and substance abuse. The patient is not nervous/anxious.    Allergic/Immunologic: Negative for persistent infections.        Objective:      Vitals:    12/01/17 0829   BP: 109/74   Pulse: 75   Weight: 106 kg (233 lb 11 oz)   Height: 5' 10" (1.778 m) "   PainSc: 0-No pain     Body mass index is 33.53 kg/m².    Physical Exam   Constitutional: He is oriented to person, place, and time. He appears well-developed and well-nourished. He is active.   HENT:   Head: Normocephalic and atraumatic.   Mouth/Throat: Oropharynx is clear and moist and mucous membranes are normal.   Eyes: Conjunctivae and EOM are normal. Pupils are equal, round, and reactive to light.   Neck: Normal range of motion. Neck supple. Normal carotid pulses, no hepatojugular reflux and no JVD present. Carotid bruit is not present. No tracheal deviation, no edema and no erythema present. No thyromegaly present.   Cardiovascular: Normal rate and regular rhythm.   No extrasystoles are present. PMI is not displaced.  Exam reveals no gallop, no distant heart sounds, no friction rub and no midsystolic click.    Murmur heard.   Systolic murmur is present with a grade of 1/6  at the lower left sternal border  Pulses:       Carotid pulses are 2+ on the right side, and 2+ on the left side.       Radial pulses are 2+ on the right side, and 2+ on the left side.        Femoral pulses are 2+ on the right side, and 2+ on the left side.       Dorsalis pedis pulses are 2+ on the right side, and 2+ on the left side.        Posterior tibial pulses are 2+ on the right side, and 2+ on the left side.   Pulmonary/Chest: Effort normal and breath sounds normal. No accessory muscle usage. No tachypnea and no bradypnea. No respiratory distress.   Abdominal: Soft. Bowel sounds are normal. He exhibits no distension and no mass. There is no hepatosplenomegaly. There is no tenderness. There is no CVA tenderness.   Musculoskeletal: Normal range of motion. He exhibits no edema or deformity.   Lymphadenopathy:     He has no cervical adenopathy.   Neurological: He is alert and oriented to person, place, and time. He has normal strength. He displays no tremor. No cranial nerve deficit. He exhibits normal muscle tone. Coordination normal.    Skin: Skin is warm and dry. No cyanosis or erythema. No pallor.   Psychiatric: He has a normal mood and affect. His speech is normal and behavior is normal. Judgment and thought content normal.               ..    Chemistry        Component Value Date/Time     11/27/2017 0716     11/27/2017 0716    K 4.4 11/27/2017 0716    K 4.4 11/27/2017 0716     11/27/2017 0716     11/27/2017 0716    CO2 28 11/27/2017 0716    CO2 28 11/27/2017 0716    BUN 18 11/27/2017 0716    BUN 18 11/27/2017 0716    CREATININE 0.93 11/27/2017 0716    CREATININE 0.93 11/27/2017 0716     (H) 11/27/2017 0716     (H) 11/27/2017 0716        Component Value Date/Time    CALCIUM 9.4 11/27/2017 0716    CALCIUM 9.4 11/27/2017 0716    ALKPHOS 60 11/27/2017 0716    ALKPHOS 60 11/27/2017 0716    AST 29 11/27/2017 0716    AST 29 11/27/2017 0716    AST 33 12/02/2015 0716    ALT 56 (H) 11/27/2017 0716    ALT 56 (H) 11/27/2017 0716    BILITOT 0.5 11/27/2017 0716    BILITOT 0.5 11/27/2017 0716    ESTGFRAFRICA >60 11/27/2017 0716    ESTGFRAFRICA >60 11/27/2017 0716    EGFRNONAA >60 11/27/2017 0716    EGFRNONAA >60 11/27/2017 0716            ..  Lab Results   Component Value Date    CHOL 132 11/27/2017    CHOL 132 11/27/2017    CHOL 134 12/05/2016     Lab Results   Component Value Date    HDL 38 (L) 11/27/2017    HDL 38 (L) 11/27/2017    HDL 41 12/05/2016     Lab Results   Component Value Date    LDLCALC 75.0 11/27/2017    LDLCALC 75.0 11/27/2017    LDLCALC 72.6 12/05/2016     Lab Results   Component Value Date    TRIG 95 11/27/2017    TRIG 95 11/27/2017    TRIG 102 12/05/2016     Lab Results   Component Value Date    CHOLHDL 28.8 11/27/2017    CHOLHDL 28.8 11/27/2017    CHOLHDL 30.6 12/05/2016     ..  Lab Results   Component Value Date    WBC 8.30 11/27/2017    HGB 16.0 11/27/2017    HCT 48.5 11/27/2017    MCV 93 11/27/2017     11/27/2017       Test(s) Reviewed  I have reviewed the following in detail:  [] Stress  test   [] Angiography   [] Echocardiogram   [] Labs   [] Other:       Assessment:         ICD-10-CM ICD-9-CM   1. Essential hypertension I10 401.9   2. Pulmonary hypertension I27.20 416.8   3. Hypercholesterolemia E78.00 272.0   4. Tobacco use Z72.0 305.1   5. Agatston CAC score, <100 R93.1 793.2     Problem List Items Addressed This Visit        Pulmonary    Pulmonary hypertension       Cardiac/Vascular    Essential hypertension - Primary    Hypercholesterolemia    Agatston CAC score, <100       Other    Tobacco use           Plan:         DC ZETIA, ALL CV CLINICALLY STABLE, NO ANGINA, NO HF, NO TIA, NO CLINICAL ARRHYTHMIA,CONTINUE CURRENT MEDS, EDUCATION, DIET, EXERCISE, WEIGHT LOSS, RTC IMN 7 MO WITH LABS  Essential hypertension    Pulmonary hypertension    Hypercholesterolemia    Tobacco use    Agatston CAC score, <100    RTC Low level/low impact aerobic exercise 5x's/wk. Heart healthy diet and risk factor modification.    See labs and med orders.    Aerobic exercise 5x's/wk. Heart healthy diet and risk factor modification.    See labs and med orders.

## 2018-06-19 ENCOUNTER — TELEPHONE (OUTPATIENT)
Dept: CARDIOLOGY | Facility: CLINIC | Age: 48
End: 2018-06-19

## 2018-06-19 NOTE — TELEPHONE ENCOUNTER
----- Message from Shahram Hester sent at 6/19/2018  1:55 PM CDT -----  Contact: Bhumika with [facility] with [facility] called, patient needs a hospital f/u for this Thursday, please call the patient to schedule at [phone] (home) [phone] (work)

## 2018-07-20 ENCOUNTER — OFFICE VISIT (OUTPATIENT)
Dept: CARDIOLOGY | Facility: CLINIC | Age: 48
End: 2018-07-20
Payer: COMMERCIAL

## 2018-07-20 VITALS
BODY MASS INDEX: 33.55 KG/M2 | WEIGHT: 234.38 LBS | HEIGHT: 70 IN | HEART RATE: 79 BPM | SYSTOLIC BLOOD PRESSURE: 124 MMHG | DIASTOLIC BLOOD PRESSURE: 82 MMHG

## 2018-07-20 DIAGNOSIS — I10 ESSENTIAL HYPERTENSION: ICD-10-CM

## 2018-07-20 DIAGNOSIS — I27.20 PULMONARY HYPERTENSION: ICD-10-CM

## 2018-07-20 DIAGNOSIS — E78.00 HYPERCHOLESTEROLEMIA: ICD-10-CM

## 2018-07-20 DIAGNOSIS — R07.2 PRECORDIAL PAIN: Primary | ICD-10-CM

## 2018-07-20 DIAGNOSIS — I51.89 DIASTOLIC DYSFUNCTION: ICD-10-CM

## 2018-07-20 DIAGNOSIS — I73.9 ASYMPTOMATIC PVD (PERIPHERAL VASCULAR DISEASE): ICD-10-CM

## 2018-07-20 DIAGNOSIS — Z72.0 TOBACCO USE: ICD-10-CM

## 2018-07-20 PROCEDURE — 99214 OFFICE O/P EST MOD 30 MIN: CPT | Mod: S$GLB,,, | Performed by: INTERNAL MEDICINE

## 2018-07-20 PROCEDURE — 99999 PR PBB SHADOW E&M-EST. PATIENT-LVL III: CPT | Mod: PBBFAC,,, | Performed by: INTERNAL MEDICINE

## 2018-07-20 PROCEDURE — 3079F DIAST BP 80-89 MM HG: CPT | Mod: CPTII,S$GLB,, | Performed by: INTERNAL MEDICINE

## 2018-07-20 PROCEDURE — 3008F BODY MASS INDEX DOCD: CPT | Mod: CPTII,S$GLB,, | Performed by: INTERNAL MEDICINE

## 2018-07-20 PROCEDURE — 3074F SYST BP LT 130 MM HG: CPT | Mod: CPTII,S$GLB,, | Performed by: INTERNAL MEDICINE

## 2018-07-20 NOTE — PROGRESS NOTES
Subjective:    Patient ID:  William Winslow is a 47 y.o. male who presents for Hypertension and Chest Pain        HPI  S/P [facility] WITH CP, NEGATIVE ENZYMES, WAS RELIEVED AFTER HE WENT TO [facility], NO RECURRENCE OF SX, MOSTLY ON THE R/ MIDDLE, NO LABS, SEE ROS    Past Medical History:   Diagnosis Date    Chicken pox     GERD (gastroesophageal reflux disease)     Hyperlipidemia     Hypertension     Insomnia     Psoriasis     Sleep apnea     Stroke      Past Surgical History:   Procedure Laterality Date    MOUTH SURGERY      NASAL SEPTUM SURGERY       Family History   Problem Relation Age of Onset    Heart disease Father      Social History     Social History    Marital status:      Spouse name: N/A    Number of children: N/A    Years of education: N/A     Social History Main Topics    Smoking status: Current Every Day Smoker     Packs/day: 1.00     Types: Cigarettes    Smokeless tobacco: Never Used    Alcohol use No    Drug use: No    Sexual activity: Not on file     Other Topics Concern    Not on file     Social History Narrative    No narrative on file       Review of patient's allergies indicates:  No Known Allergies    Current Outpatient Prescriptions:     ascorbic acid (VITAMIN C) 1000 MG tablet, Take 1,000 mg by mouth once daily., Disp: , Rfl:     aspirin 81 MG Chew, Take 81 mg by mouth once daily., Disp: , Rfl:     COQ10, LIPOSOMAL UBIQUINOL, ORAL, Take 1 capsule by mouth once daily., Disp: , Rfl:     escitalopram oxalate (LEXAPRO) 20 MG tablet, Take 1 tablet (20 mg total) by mouth once daily., Disp: 90 tablet, Rfl: 3    icosapent ethyl (VASCEPA) 1 gram Cap, Take 2 capsules by mouth 2 (two) times daily., Disp: 360 capsule, Rfl: 3    metFORMIN (GLUCOPHAGE) 1000 MG tablet, Take 1 tablet (1,000 mg total) by mouth daily with breakfast., Disp: 90 tablet, Rfl: 3    olmesartan (BENICAR) 20 MG tablet, Take 1 tablet (20 mg total) by mouth once daily., Disp: 90 tablet, Rfl:  "3    pantoprazole (PROTONIX) 40 MG tablet, Take 1 tablet (40 mg total) by mouth daily as needed., Disp: 90 tablet, Rfl: 9    pitavastatin calcium (LIVALO) 4 mg Tab, Take 4 mg by mouth Daily., Disp: 90 tablet, Rfl: 3    vitamin D 1000 units Tab, Take 185 mg by mouth once daily., Disp: , Rfl:     Review of Systems   Constitution: Negative for chills, diaphoresis, fever, weakness, malaise/fatigue and night sweats.   HENT: Negative for congestion and nosebleeds.    Eyes: Negative for blurred vision, double vision and visual disturbance.   Cardiovascular: Negative for chest pain (RESOLVED), claudication, cyanosis, irregular heartbeat, leg swelling, near-syncope, orthopnea, palpitations, paroxysmal nocturnal dyspnea and syncope. Dyspnea on exertion: MINIMAL.   Respiratory: Negative for cough, hemoptysis, shortness of breath and wheezing.         USES MOUTH PIECE FOR SLEEP APNEA   Endocrine: Negative for cold intolerance, heat intolerance and polyuria.        BG ?   Hematologic/Lymphatic: Negative for adenopathy and bleeding problem. Does not bruise/bleed easily.   Skin: Negative for color change, itching and rash.        PSORIASIS   Musculoskeletal: Negative for back pain, falls and stiffness. Joint pain: SOME.   Gastrointestinal: Negative for abdominal pain, change in bowel habit, dysphagia, heartburn, hematemesis, jaundice, melena and vomiting.   Genitourinary: Negative for dysuria, flank pain, frequency and nocturia.   Neurological: Negative for brief paralysis, dizziness, focal weakness, light-headedness, loss of balance, numbness, paresthesias and tremors.   Psychiatric/Behavioral: Negative for altered mental status, depression, memory loss and substance abuse. The patient is not nervous/anxious.    Allergic/Immunologic: Negative for hives and persistent infections.        Objective:      Vitals:    07/20/18 0818   BP: 124/82   Pulse: 79   Weight: 106.3 kg (234 lb 5.6 oz)   Height: 5' 10" (1.778 m)   PainSc: 0-No " pain     Body mass index is 33.63 kg/m².    Physical Exam   Constitutional: He is oriented to person, place, and time. He appears well-developed and well-nourished. He is active.   HENT:   Head: Normocephalic and atraumatic.   Mouth/Throat: Oropharynx is clear and moist and mucous membranes are normal.   Eyes: Conjunctivae and EOM are normal. Pupils are equal, round, and reactive to light.   Neck: Normal range of motion. Neck supple. Normal carotid pulses, no hepatojugular reflux and no JVD present. Carotid bruit is not present. No tracheal deviation, no edema and no erythema present. No thyromegaly present.   Cardiovascular: Normal rate and regular rhythm.   No extrasystoles are present. PMI is not displaced.  Exam reveals no gallop, no distant heart sounds, no friction rub and no midsystolic click.    Murmur heard.   Systolic murmur is present with a grade of 1/6  at the lower left sternal border  Pulses:       Carotid pulses are 2+ on the right side, and 2+ on the left side.       Radial pulses are 2+ on the right side, and 2+ on the left side.        Femoral pulses are 2+ on the right side with bruit, and 2+ on the left side.       Dorsalis pedis pulses are 2+ on the right side, and 2+ on the left side.        Posterior tibial pulses are 2+ on the right side, and 2+ on the left side.   Pulmonary/Chest: Effort normal and breath sounds normal. No accessory muscle usage. No tachypnea and no bradypnea. No respiratory distress.   Abdominal: Soft. Bowel sounds are normal. He exhibits no distension and no mass. There is no hepatosplenomegaly. There is no tenderness. There is no CVA tenderness.   Musculoskeletal: Normal range of motion. He exhibits no edema or deformity.   Lymphadenopathy:     He has no cervical adenopathy.   Neurological: He is alert and oriented to person, place, and time. He has normal strength. He displays no tremor. No cranial nerve deficit. Coordination normal.   Skin: Skin is warm and dry. No  cyanosis or erythema. No pallor.   Psychiatric: He has a normal mood and affect. His speech is normal and behavior is normal. Judgment normal.               ..    Chemistry        Component Value Date/Time     11/27/2017 0716     11/27/2017 0716    K 4.4 11/27/2017 0716    K 4.4 11/27/2017 0716     11/27/2017 0716     11/27/2017 0716    CO2 28 11/27/2017 0716    CO2 28 11/27/2017 0716    BUN 18 11/27/2017 0716    BUN 18 11/27/2017 0716    CREATININE 0.93 11/27/2017 0716    CREATININE 0.93 11/27/2017 0716     (H) 11/27/2017 0716     (H) 11/27/2017 0716        Component Value Date/Time    CALCIUM 9.4 11/27/2017 0716    CALCIUM 9.4 11/27/2017 0716    ALKPHOS 60 11/27/2017 0716    ALKPHOS 60 11/27/2017 0716    AST 29 11/27/2017 0716    AST 29 11/27/2017 0716    AST 33 12/02/2015 0716    ALT 56 (H) 11/27/2017 0716    ALT 56 (H) 11/27/2017 0716    BILITOT 0.5 11/27/2017 0716    BILITOT 0.5 11/27/2017 0716    ESTGFRAFRICA >60 11/27/2017 0716    ESTGFRAFRICA >60 11/27/2017 0716    EGFRNONAA >60 11/27/2017 0716    EGFRNONAA >60 11/27/2017 0716            ..  Lab Results   Component Value Date    CHOL 132 11/27/2017    CHOL 132 11/27/2017    CHOL 134 12/05/2016     Lab Results   Component Value Date    HDL 38 (L) 11/27/2017    HDL 38 (L) 11/27/2017    HDL 41 12/05/2016     Lab Results   Component Value Date    LDLCALC 75.0 11/27/2017    LDLCALC 75.0 11/27/2017    LDLCALC 72.6 12/05/2016     Lab Results   Component Value Date    TRIG 95 11/27/2017    TRIG 95 11/27/2017    TRIG 102 12/05/2016     Lab Results   Component Value Date    CHOLHDL 28.8 11/27/2017    CHOLHDL 28.8 11/27/2017    CHOLHDL 30.6 12/05/2016     ..  Lab Results   Component Value Date    WBC 8.30 11/27/2017    HGB 16.0 11/27/2017    HCT 48.5 11/27/2017    MCV 93 11/27/2017     11/27/2017       Test(s) Reviewed  I have reviewed the following in detail:  [] Stress test   [] Angiography   [] Echocardiogram   [] Labs    [x] Other:       Assessment:         ICD-10-CM ICD-9-CM   1. Precordial pain R07.2 786.51   2. Essential hypertension I10 401.9   3. Asymptomatic PVD (peripheral vascular disease) I73.9 443.9   4. Diastolic dysfunction I51.9 429.9   5. Hypercholesterolemia E78.00 272.0   6. Pulmonary hypertension I27.20 416.8   7. Tobacco use Z72.0 305.1     Problem List Items Addressed This Visit        Pulmonary    Pulmonary hypertension    Relevant Orders    Comprehensive metabolic panel       Cardiac/Vascular    Essential hypertension    Relevant Orders    Comprehensive metabolic panel    Diastolic dysfunction    Relevant Orders    Comprehensive metabolic panel    Hypercholesterolemia    Relevant Orders    Comprehensive metabolic panel    Lipid panel       Other    Tobacco use    Relevant Orders    Ambulatory referral to Smoking Cessation Program    Comprehensive metabolic panel    Precordial pain - Primary    Relevant Orders    Comprehensive metabolic panel    Echocardiogram stress test with CFD      Other Visit Diagnoses     Asymptomatic PVD (peripheral vascular disease)        Relevant Orders    Comprehensive metabolic panel    Lipid panel           Plan:     CHECK STRESS ECHO, LABS, ALL OTHER CV CLINICALLY STABLE,  NO HF, NO TIA, NO CLINICAL ARRHYTHMIA,CONTINUE CURRENT MEDS, EDUCATION, DIET, EXERCISE. WEIGHT LOSS, TOBACCO CESSTION      Precordial pain  -     Comprehensive metabolic panel; Future; Expected date: 07/20/2018  -     Echocardiogram stress test with CFD; Future    Essential hypertension  -     Comprehensive metabolic panel; Future; Expected date: 07/20/2018    Asymptomatic PVD (peripheral vascular disease)  -     Comprehensive metabolic panel; Future; Expected date: 07/20/2018  -     Lipid panel; Future; Expected date: 07/20/2018    Diastolic dysfunction  -     Comprehensive metabolic panel; Future; Expected date: 07/20/2018    Hypercholesterolemia  -     Comprehensive metabolic panel; Future; Expected date:  07/20/2018  -     Lipid panel; Future; Expected date: 07/20/2018    Pulmonary hypertension  -     Comprehensive metabolic panel; Future; Expected date: 07/20/2018    Tobacco use  -     Ambulatory referral to Smoking Cessation Program  -     Comprehensive metabolic panel; Future; Expected date: 07/20/2018    RTC Low level/low impact aerobic exercise 5x's/wk. Heart healthy diet and risk factor modification.    See labs and med orders.    Aerobic exercise 5x's/wk. Heart healthy diet and risk factor modification.    See labs and med orders.

## 2018-07-22 RX ORDER — OLMESARTAN MEDOXOMIL 20 MG/1
20 TABLET ORAL DAILY
Qty: 90 TABLET | Refills: 1 | Status: SHIPPED | OUTPATIENT
Start: 2018-07-22 | End: 2018-12-10 | Stop reason: SDUPTHER

## 2018-07-27 ENCOUNTER — CLINICAL SUPPORT (OUTPATIENT)
Dept: CARDIOLOGY | Facility: CLINIC | Age: 48
End: 2018-07-27
Attending: INTERNAL MEDICINE
Payer: COMMERCIAL

## 2018-07-27 VITALS — HEIGHT: 70 IN | BODY MASS INDEX: 33.5 KG/M2 | WEIGHT: 234 LBS

## 2018-07-27 DIAGNOSIS — R07.2 PRECORDIAL PAIN: ICD-10-CM

## 2018-07-27 LAB
ASCENDING AORTA: 3.17 CM
AV MEAN GRADIENT: 3.95 MMHG
AV PEAK GRADIENT: 6.92 MMHG
AV VALVE AREA: 3.85 CM2
BSA FOR ECHO PROCEDURE: 2.29 M2
CV ECHO LV RWT: 0.53 CM
CV STRESS BASE HR: 84
CVPEAKDIABP: 62
CVPEAKSYSBP: 160
CVRESTDIABP: 79
CVRESTSYSBP: 120
DOP CALC AO PEAK VEL: 1.32 M/S
DOP CALC AO VTI: 26.79 CM
DOP CALC LVOT AREA: 4.37 CM2
DOP CALC LVOT DIAMETER: 2.36 CM
DOP CALC LVOT STROKE VOLUME: 103.27 CM3
DOP CALCLVOT PEAK VEL VTI: 23.62 CM
E WAVE DECELERATION TIME: 137.25 MSEC
E/A RATIO: 1.02
E/E' RATIO: 8.67
ECHO LV POSTERIOR WALL: 0.9 CM (ref 0.6–1.1)
FRACTIONAL SHORTENING: 33 % (ref 28–44)
INTERVENTRICULAR SEPTUM: 1.02 CM (ref 0.6–1.1)
IVRT: 0.1 MSEC
LA MAJOR: 3.69 CM
LA MINOR: 4.43 CM
LA WIDTH: 2.56 CM
LEFT ATRIUM SIZE: 3.15 CM
LEFT ATRIUM VOLUME INDEX: 12.1 ML/M2
LEFT ATRIUM VOLUME: 27.6 CM3
LEFT INTERNAL DIMENSION IN SYSTOLE: 2.4 CM (ref 2.1–4)
LEFT VENTRICLE MASS INDEX: 44.4 G/M2
LEFT VENTRICULAR INTERNAL DIMENSION IN DIASTOLE: 3.6 CM (ref 3.5–6)
LEFT VENTRICULAR MASS: 101.72 G
LV LATERAL E/E' RATIO: 8.13
LV SEPTAL E/E' RATIO: 9.29
MV PEAK A VEL: 0.64 M/S
MV PEAK E VEL: 0.65 M/S
MV STENOSIS PRESSURE HALF TIME: 39.8 MS
MV VALVE AREA P 1/2 METHOD: 5.53 CM2
PISA TR MAX VEL: 2.53 M/S
PULM VEIN S/D RATIO: 1.3
PV PEAK D VEL: 0.4 M/S
PV PEAK S VEL: 0.52 M/S
RA MAJOR: 3.79 CM
RA PRESSURE: 3 MMHG
RA WIDTH: 3.21 CM
RIGHT VENTRICULAR END-DIASTOLIC DIMENSION: 3.73 CM
SINUS: 2.76 CM
STJ: 2.93 CM
STRESS ECHO POST ESTIMATED WORKLOAD: 12 METS
STRESS ECHO POST EXERCISE DUR MIN: 6 MIN
STRESS ECHO POST EXERCISE DUR SEC: 44
TDI LATERAL: 0.08
TDI SEPTAL: 0.07
TDI: 0.08
TR MAX PG: 25.6 MMHG
TRICUSPID ANNULAR PLANE SYSTOLIC EXCURSION: 0.02 CM
TV REST PULMONARY ARTERY PRESSURE: 28.6 MMHG

## 2018-07-27 PROCEDURE — 93351 STRESS TTE COMPLETE: CPT | Mod: S$GLB,,, | Performed by: INTERNAL MEDICINE

## 2018-07-27 PROCEDURE — 99999 PR PBB SHADOW E&M-EST. PATIENT-LVL I: CPT | Mod: PBBFAC,,,

## 2018-08-02 ENCOUNTER — TELEPHONE (OUTPATIENT)
Dept: CARDIOLOGY | Facility: CLINIC | Age: 48
End: 2018-08-02

## 2018-09-11 PROBLEM — K21.00 GASTROESOPHAGEAL REFLUX DISEASE WITH ESOPHAGITIS: Status: ACTIVE | Noted: 2018-09-11

## 2018-09-11 PROBLEM — L40.9 PSORIASIS: Status: ACTIVE | Noted: 2018-09-11

## 2018-09-11 PROBLEM — E11.9 TYPE 2 DIABETES MELLITUS WITHOUT COMPLICATION: Status: RESOLVED | Noted: 2017-10-06 | Resolved: 2018-09-11

## 2018-09-11 PROBLEM — R73.9 ELEVATED BLOOD SUGAR: Status: ACTIVE | Noted: 2018-09-11

## 2018-09-11 PROBLEM — E78.5 DYSLIPIDEMIA, GOAL LDL BELOW 100: Status: ACTIVE | Noted: 2017-10-06

## 2018-12-10 ENCOUNTER — OFFICE VISIT (OUTPATIENT)
Dept: CARDIOLOGY | Facility: CLINIC | Age: 48
End: 2018-12-10
Payer: COMMERCIAL

## 2018-12-10 VITALS
HEART RATE: 80 BPM | WEIGHT: 233.25 LBS | HEIGHT: 70 IN | BODY MASS INDEX: 33.39 KG/M2 | SYSTOLIC BLOOD PRESSURE: 115 MMHG | DIASTOLIC BLOOD PRESSURE: 77 MMHG

## 2018-12-10 DIAGNOSIS — I34.0 NON-RHEUMATIC MITRAL REGURGITATION: ICD-10-CM

## 2018-12-10 DIAGNOSIS — E66.9 OBESITY, CLASS I, BMI 30-34.9: ICD-10-CM

## 2018-12-10 DIAGNOSIS — E78.5 DYSLIPIDEMIA, GOAL LDL BELOW 100: ICD-10-CM

## 2018-12-10 DIAGNOSIS — Z72.0 TOBACCO USE: ICD-10-CM

## 2018-12-10 DIAGNOSIS — R73.03 PRE-DIABETES: ICD-10-CM

## 2018-12-10 DIAGNOSIS — I10 ESSENTIAL HYPERTENSION: Primary | ICD-10-CM

## 2018-12-10 DIAGNOSIS — R93.1 AGATSTON CAC SCORE, <100: ICD-10-CM

## 2018-12-10 PROBLEM — I27.20 PULMONARY HYPERTENSION: Status: RESOLVED | Noted: 2017-12-01 | Resolved: 2018-12-10

## 2018-12-10 PROCEDURE — 99999 PR PBB SHADOW E&M-EST. PATIENT-LVL III: CPT | Mod: PBBFAC,,, | Performed by: INTERNAL MEDICINE

## 2018-12-10 PROCEDURE — 3074F SYST BP LT 130 MM HG: CPT | Mod: CPTII,S$GLB,, | Performed by: INTERNAL MEDICINE

## 2018-12-10 PROCEDURE — 3078F DIAST BP <80 MM HG: CPT | Mod: CPTII,S$GLB,, | Performed by: INTERNAL MEDICINE

## 2018-12-10 PROCEDURE — 3008F BODY MASS INDEX DOCD: CPT | Mod: CPTII,S$GLB,, | Performed by: INTERNAL MEDICINE

## 2018-12-10 PROCEDURE — 99214 OFFICE O/P EST MOD 30 MIN: CPT | Mod: S$GLB,,, | Performed by: INTERNAL MEDICINE

## 2018-12-10 RX ORDER — PITAVASTATIN CALCIUM 4.18 MG/1
4 TABLET, FILM COATED ORAL DAILY
Qty: 90 TABLET | Refills: 1 | Status: SHIPPED | OUTPATIENT
Start: 2018-12-10 | End: 2019-04-18

## 2018-12-10 RX ORDER — OLMESARTAN MEDOXOMIL 20 MG/1
20 TABLET ORAL DAILY
Qty: 90 TABLET | Refills: 1 | Status: SHIPPED | OUTPATIENT
Start: 2018-12-10 | End: 2019-06-18 | Stop reason: SDUPTHER

## 2018-12-10 NOTE — PROGRESS NOTES
Subjective:    Patient ID:  William Winslow is a 48 y.o. male who presents for Hypertension (labs)        HPI  DISCUSSED LABS AND GOALS, TSH OK, , HBA1C,5.7%,  , HDL 38, STILL SMOKES BUT LESS NOW 1/2 PPD, , OVERALL DOING OK, SEE ROS    Past Medical History:   Diagnosis Date    Cervical herniated disc     Chicken pox     GERD (gastroesophageal reflux disease)     Hyperlipidemia     Hypertension     Insomnia     Psoriasis     Sleep apnea     Stroke     Type 2 diabetes mellitus without complication 10/6/2017     Past Surgical History:   Procedure Laterality Date    MOUTH SURGERY      NASAL SEPTUM SURGERY       Family History   Problem Relation Age of Onset    Heart disease Father      Social History     Socioeconomic History    Marital status:      Spouse name: Not on file    Number of children: Not on file    Years of education: Not on file    Highest education level: Not on file   Social Needs    Financial resource strain: Not on file    Food insecurity - worry: Not on file    Food insecurity - inability: Not on file    Transportation needs - medical: Not on file    Transportation needs - non-medical: Not on file   Occupational History    Not on file   Tobacco Use    Smoking status: Current Every Day Smoker     Packs/day: 1.00     Types: Cigarettes    Smokeless tobacco: Never Used   Substance and Sexual Activity    Alcohol use: No    Drug use: No    Sexual activity: Not on file   Other Topics Concern    Not on file   Social History Narrative    Not on file       Review of patient's allergies indicates:  No Known Allergies    Current Outpatient Medications:     ascorbic acid (VITAMIN C) 1000 MG tablet, Take 1,000 mg by mouth once daily., Disp: , Rfl:     aspirin 81 MG Chew, Take 81 mg by mouth once daily., Disp: , Rfl:     COQ10, LIPOSOMAL UBIQUINOL, ORAL, Take 1 capsule by mouth once daily., Disp: , Rfl:     escitalopram oxalate (LEXAPRO) 20 MG tablet, Take 1  tablet (20 mg total) by mouth once daily., Disp: 90 tablet, Rfl: 4    icosapent ethyl (VASCEPA) 1 gram Cap, TAKE 2 CAPSULES BY MOUTH  TWO TIMES DAILY, Disp: 360 capsule, Rfl: 3    lidocaine (LIDODERM) 5 %, Place 1 patch onto the skin once daily. Remove & Discard patch within 12 hours or as directed by MD, Disp: 12 patch, Rfl: 0    LYRICA 25 mg capsule, Take 25 mg by mouth every evening., Disp: , Rfl: 0    metFORMIN (GLUCOPHAGE) 1000 MG tablet, Take 1 tablet (1,000 mg total) by mouth daily with breakfast., Disp: 90 tablet, Rfl: 3    olmesartan (BENICAR) 20 MG tablet, Take 1 tablet (20 mg total) by mouth once daily., Disp: 90 tablet, Rfl: 1    pantoprazole (PROTONIX) 40 MG tablet, Take 1 tablet (40 mg total) by mouth daily as needed., Disp: 90 tablet, Rfl: 3    pitavastatin calcium (LIVALO) 4 mg Tab, Take 4 mg by mouth Daily., Disp: 90 tablet, Rfl: 1    tamsulosin (FLOMAX) 0.4 mg Cap, Take 1 capsule (0.4 mg total) by mouth once daily., Disp: 90 capsule, Rfl: 3    vitamin D 1000 units Tab, Take 185 mg by mouth once daily., Disp: , Rfl:     gabapentin (NEURONTIN) 100 MG capsule, , Disp: , Rfl:     HYDROcodone-acetaminophen (NORCO) 5-325 mg per tablet, Take 1 tablet by mouth every 6 (six) hours as needed., Disp: , Rfl: 0    naproxen (NAPROSYN) 500 MG tablet, Take 1 tablet (500 mg total) by mouth 2 (two) times daily with meals., Disp: 30 tablet, Rfl: 0    Review of Systems   Constitution: Negative for chills, diaphoresis, fever, weakness, malaise/fatigue and night sweats.   HENT: Negative for congestion and nosebleeds.    Eyes: Negative for blurred vision and visual disturbance.   Cardiovascular: Negative for chest pain, claudication, cyanosis, dyspnea on exertion (MINIMAL), irregular heartbeat, leg swelling, near-syncope, orthopnea, palpitations, paroxysmal nocturnal dyspnea and syncope.   Respiratory: Negative for cough, hemoptysis, shortness of breath and wheezing.         USES MOUTH PIECE FOR SLEEP APNEA  "  Endocrine: Negative for cold intolerance, heat intolerance and polyuria.        BG ?   Hematologic/Lymphatic: Negative for adenopathy and bleeding problem. Does not bruise/bleed easily.   Skin: Negative for color change and rash.        PSORIASIS   Musculoskeletal: Negative for back pain, falls and stiffness. Joint pain: SOME.   Gastrointestinal: Negative for abdominal pain, change in bowel habit, dysphagia, heartburn, hematemesis, jaundice, melena and vomiting.   Genitourinary: Negative for dysuria, flank pain, frequency and nocturia.   Neurological: Negative for brief paralysis, dizziness, focal weakness, light-headedness, loss of balance, numbness, paresthesias and tremors.   Psychiatric/Behavioral: Negative for altered mental status, depression and memory loss. The patient is not nervous/anxious.    Allergic/Immunologic: Negative for environmental allergies and persistent infections.        Objective:      Vitals:    12/10/18 1312   BP: 115/77   Pulse: 80   Weight: 105.8 kg (233 lb 4 oz)   Height: 5' 10" (1.778 m)   PainSc: 0-No pain     Body mass index is 33.47 kg/m².    Physical Exam   Constitutional: He is oriented to person, place, and time. He appears well-developed and well-nourished. He is active.   HENT:   Head: Normocephalic and atraumatic.   Mouth/Throat: Oropharynx is clear and moist and mucous membranes are normal.   Eyes: Conjunctivae and EOM are normal. Pupils are equal, round, and reactive to light.   Neck: Normal range of motion. Neck supple. Normal carotid pulses, no hepatojugular reflux and no JVD present. Carotid bruit is not present. No tracheal deviation, no edema and no erythema present. No thyromegaly present.   Cardiovascular: Normal rate and regular rhythm.  No extrasystoles are present. PMI is not displaced. Exam reveals no gallop, no distant heart sounds, no friction rub and no midsystolic click.   Murmur heard.   Systolic murmur is present with a grade of 1/6 at the lower left " sternal border.  Pulses:       Carotid pulses are 2+ on the right side, and 2+ on the left side.       Radial pulses are 2+ on the right side, and 2+ on the left side.        Femoral pulses are 2+ on the right side with bruit, and 2+ on the left side.       Dorsalis pedis pulses are 2+ on the right side, and 2+ on the left side.        Posterior tibial pulses are 2+ on the right side, and 2+ on the left side.   Pulmonary/Chest: Effort normal and breath sounds normal. No accessory muscle usage. No tachypnea and no bradypnea. No respiratory distress.   Abdominal: Soft. Bowel sounds are normal. He exhibits no distension and no mass. There is no hepatosplenomegaly. There is no tenderness. There is no CVA tenderness.   Musculoskeletal: Normal range of motion. He exhibits no edema or deformity.   Lymphadenopathy:     He has no cervical adenopathy.   Neurological: He is alert and oriented to person, place, and time. He has normal strength. He displays no tremor. No cranial nerve deficit. Coordination normal.   Skin: Skin is warm and dry. No cyanosis or erythema. No pallor.   Psychiatric: He has a normal mood and affect. His speech is normal and behavior is normal.               ..    Chemistry        Component Value Date/Time     11/29/2018 0725    K 4.7 11/29/2018 0725     11/29/2018 0725    CO2 25 11/29/2018 0725    BUN 17 11/29/2018 0725    CREATININE 0.81 11/29/2018 0725     (H) 11/29/2018 0725        Component Value Date/Time    CALCIUM 9.4 11/29/2018 0725    ALKPHOS 65 11/29/2018 0725    AST 31 11/29/2018 0725    AST 33 12/02/2015 0716    ALT 45 (H) 11/29/2018 0725    BILITOT 0.3 11/29/2018 0725    ESTGFRAFRICA >60 11/29/2018 0725    EGFRNONAA >60 11/29/2018 0725            ..  Lab Results   Component Value Date    CHOL 175 11/29/2018    CHOL 132 11/27/2017    CHOL 132 11/27/2017     Lab Results   Component Value Date    HDL 38 (L) 11/29/2018    HDL 38 (L) 11/27/2017    HDL 38 (L) 11/27/2017      Lab Results   Component Value Date    LDLCALC 111.6 11/29/2018    LDLCALC 75.0 11/27/2017    LDLCALC 75.0 11/27/2017     Lab Results   Component Value Date    TRIG 127 11/29/2018    TRIG 95 11/27/2017    TRIG 95 11/27/2017     Lab Results   Component Value Date    CHOLHDL 21.7 11/29/2018    CHOLHDL 28.8 11/27/2017    CHOLHDL 28.8 11/27/2017     ..  Lab Results   Component Value Date    WBC 7.14 11/29/2018    HGB 15.6 11/29/2018    HCT 46.8 11/29/2018    MCV 92 11/29/2018     11/29/2018       Test(s) Reviewed  I have reviewed the following in detail:  [] Stress test   [] Angiography   [] Echocardiogram   [x] Labs   [] Other:       Assessment:         ICD-10-CM ICD-9-CM   1. Essential hypertension I10 401.9   2. Non-rheumatic mitral regurgitation I34.0 424.0   3. Dyslipidemia, goal LDL below 100 E78.5 272.4   4. Agatston CAC score, <100 R93.1 793.2   5. Obesity, Class I, BMI 30-34.9 E66.9 278.00   6. Pre-diabetes R73.03 790.29   7. Tobacco use Z72.0 305.1     Problem List Items Addressed This Visit        Cardiac/Vascular    Essential hypertension - Primary    Relevant Medications    olmesartan (BENICAR) 20 MG tablet    Other Relevant Orders    Comprehensive metabolic panel    Dyslipidemia, goal LDL below 100    Relevant Orders    Comprehensive metabolic panel    Agatston CAC score, <100    Relevant Orders    Comprehensive metabolic panel    Non-rheumatic mitral regurgitation    Relevant Orders    Comprehensive metabolic panel       Endocrine    Obesity, Class I, BMI 30-34.9    Relevant Orders    Comprehensive metabolic panel    Pre-diabetes    Relevant Orders    Comprehensive metabolic panel       Other    Tobacco use    Relevant Orders    Ambulatory referral to Smoking Cessation Program    Comprehensive metabolic panel           Plan:     ALL CV CLINICALLY STABLE, NO ANGINA, NO HF, NO TIA, NO CLINICAL ARRHYTHMIA,CONTINUE CURRENT MEDS, EDUCATION, DIET, EXERCISE, TOBACCO CESSATION, RTC IN 7 MO WITH LABS       Essential hypertension  -     Comprehensive metabolic panel; Future; Expected date: 12/10/2018  -     olmesartan (BENICAR) 20 MG tablet; Take 1 tablet (20 mg total) by mouth once daily.  Dispense: 90 tablet; Refill: 1    Non-rheumatic mitral regurgitation  -     Comprehensive metabolic panel; Future; Expected date: 12/10/2018    Dyslipidemia, goal LDL below 100  -     Comprehensive metabolic panel; Future; Expected date: 12/10/2018    Agatston CAC score, <100  -     Comprehensive metabolic panel; Future; Expected date: 12/10/2018    Obesity, Class I, BMI 30-34.9  -     Comprehensive metabolic panel; Future; Expected date: 12/10/2018    Pre-diabetes  -     Comprehensive metabolic panel; Future; Expected date: 12/10/2018    Tobacco use  -     Ambulatory referral to Smoking Cessation Program  -     Comprehensive metabolic panel; Future; Expected date: 12/10/2018    Other orders  -     pitavastatin calcium (LIVALO) 4 mg Tab; Take 4 mg by mouth Daily.  Dispense: 90 tablet; Refill: 1    RTC Low level/low impact aerobic exercise 5x's/wk. Heart healthy diet and risk factor modification.    See labs and med orders.    Aerobic exercise 5x's/wk. Heart healthy diet and risk factor modification.    See labs and med orders.

## 2019-03-12 ENCOUNTER — TELEPHONE (OUTPATIENT)
Dept: NEUROSURGERY | Facility: CLINIC | Age: 49
End: 2019-03-12

## 2019-12-09 ENCOUNTER — OFFICE VISIT (OUTPATIENT)
Dept: CARDIOLOGY | Facility: CLINIC | Age: 49
End: 2019-12-09
Payer: COMMERCIAL

## 2019-12-09 VITALS
DIASTOLIC BLOOD PRESSURE: 77 MMHG | BODY MASS INDEX: 30.71 KG/M2 | HEIGHT: 70 IN | HEART RATE: 81 BPM | SYSTOLIC BLOOD PRESSURE: 122 MMHG | WEIGHT: 214.5 LBS

## 2019-12-09 DIAGNOSIS — E78.5 DYSLIPIDEMIA, GOAL LDL BELOW 100: ICD-10-CM

## 2019-12-09 DIAGNOSIS — I10 ESSENTIAL HYPERTENSION: Primary | ICD-10-CM

## 2019-12-09 DIAGNOSIS — R73.03 PRE-DIABETES: ICD-10-CM

## 2019-12-09 DIAGNOSIS — I51.89 DIASTOLIC DYSFUNCTION: ICD-10-CM

## 2019-12-09 DIAGNOSIS — I34.0 NON-RHEUMATIC MITRAL REGURGITATION: ICD-10-CM

## 2019-12-09 DIAGNOSIS — Z72.0 TOBACCO USE: ICD-10-CM

## 2019-12-09 PROCEDURE — 99999 PR PBB SHADOW E&M-EST. PATIENT-LVL IV: ICD-10-PCS | Mod: PBBFAC,,, | Performed by: INTERNAL MEDICINE

## 2019-12-09 PROCEDURE — 3074F SYST BP LT 130 MM HG: CPT | Mod: CPTII,S$GLB,, | Performed by: INTERNAL MEDICINE

## 2019-12-09 PROCEDURE — 99214 OFFICE O/P EST MOD 30 MIN: CPT | Mod: S$GLB,,, | Performed by: INTERNAL MEDICINE

## 2019-12-09 PROCEDURE — 3078F PR MOST RECENT DIASTOLIC BLOOD PRESSURE < 80 MM HG: ICD-10-PCS | Mod: CPTII,S$GLB,, | Performed by: INTERNAL MEDICINE

## 2019-12-09 PROCEDURE — 99999 PR PBB SHADOW E&M-EST. PATIENT-LVL IV: CPT | Mod: PBBFAC,,, | Performed by: INTERNAL MEDICINE

## 2019-12-09 PROCEDURE — 99214 PR OFFICE/OUTPT VISIT, EST, LEVL IV, 30-39 MIN: ICD-10-PCS | Mod: S$GLB,,, | Performed by: INTERNAL MEDICINE

## 2019-12-09 PROCEDURE — 3008F BODY MASS INDEX DOCD: CPT | Mod: CPTII,S$GLB,, | Performed by: INTERNAL MEDICINE

## 2019-12-09 PROCEDURE — 3008F PR BODY MASS INDEX (BMI) DOCUMENTED: ICD-10-PCS | Mod: CPTII,S$GLB,, | Performed by: INTERNAL MEDICINE

## 2019-12-09 PROCEDURE — 3078F DIAST BP <80 MM HG: CPT | Mod: CPTII,S$GLB,, | Performed by: INTERNAL MEDICINE

## 2019-12-09 PROCEDURE — 3074F PR MOST RECENT SYSTOLIC BLOOD PRESSURE < 130 MM HG: ICD-10-PCS | Mod: CPTII,S$GLB,, | Performed by: INTERNAL MEDICINE

## 2019-12-09 RX ORDER — ATORVASTATIN CALCIUM 40 MG/1
40 TABLET, FILM COATED ORAL DAILY
Qty: 90 TABLET | Refills: 3 | Status: SHIPPED | OUTPATIENT
Start: 2019-12-09 | End: 2020-12-07 | Stop reason: SDUPTHER

## 2019-12-09 RX ORDER — APREMILAST 30 MG/1
TABLET, FILM COATED ORAL 2 TIMES DAILY
COMMUNITY
Start: 2019-12-06 | End: 2023-12-19 | Stop reason: ALTCHOICE

## 2019-12-09 RX ORDER — OLMESARTAN MEDOXOMIL 20 MG/1
20 TABLET ORAL DAILY
Qty: 90 TABLET | Refills: 3 | Status: SHIPPED | OUTPATIENT
Start: 2019-12-09 | End: 2020-01-14 | Stop reason: SDUPTHER

## 2019-12-09 NOTE — PROGRESS NOTES
Subjective:    Patient ID:  William Winslow is a 49 y.o. male who presents for Hypertension (Labs) and Hyperlipidemia        HPI  DISCUSSED LABS AND GOALS HBA1C 6.2%, LDL 61, HDL 30,STILL SMOKES, LAST STRESS ECHO NEGATIVE, MILD MR, DOING WELL NO CHEST PAIN NO SHORTNESS OF BREATH NO TIA TYPE SYMPTOMS NO NEAR-SYNCOPE, SEE ROS    Past Medical History:   Diagnosis Date    Cervical herniated disc     Chicken pox     GERD (gastroesophageal reflux disease)     Hyperlipidemia     Hypertension     Insomnia     Psoriasis     Sleep apnea     Stroke     Type 2 diabetes mellitus without complication 10/6/2017     Past Surgical History:   Procedure Laterality Date    MOUTH SURGERY      NASAL SEPTUM SURGERY       Family History   Problem Relation Age of Onset    Heart disease Father      Social History     Socioeconomic History    Marital status:      Spouse name: Not on file    Number of children: Not on file    Years of education: Not on file    Highest education level: Not on file   Occupational History    Not on file   Social Needs    Financial resource strain: Not on file    Food insecurity:     Worry: Not on file     Inability: Not on file    Transportation needs:     Medical: Not on file     Non-medical: Not on file   Tobacco Use    Smoking status: Current Every Day Smoker     Packs/day: 1.00     Types: Cigarettes    Smokeless tobacco: Never Used   Substance and Sexual Activity    Alcohol use: No    Drug use: No    Sexual activity: Not on file   Lifestyle    Physical activity:     Days per week: Not on file     Minutes per session: Not on file    Stress: Not on file   Relationships    Social connections:     Talks on phone: Not on file     Gets together: Not on file     Attends [employer] service: Not on file     Active member of club or organization: Not on file     Attends meetings of clubs or organizations: Not on file     Relationship status: Not on file   Other Topics Concern     Not on file   Social History Narrative    Not on file       Review of patient's allergies indicates:  No Known Allergies    Current Outpatient Medications:     ascorbic acid (VITAMIN C) 1000 MG tablet, Take 1,000 mg by mouth once daily., Disp: , Rfl:     aspirin 81 MG Chew, Take 81 mg by mouth once daily., Disp: , Rfl:     atorvastatin (LIPITOR) 40 MG tablet, Take 1 tablet (40 mg total) by mouth once daily., Disp: 90 tablet, Rfl: 3    COQ10, LIPOSOMAL UBIQUINOL, ORAL, Take 1 capsule by mouth once daily., Disp: , Rfl:     gabapentin (NEURONTIN) 100 MG capsule, Take 100 mg by mouth as needed. , Disp: , Rfl:     HYDROcodone-acetaminophen (NORCO) 5-325 mg per tablet, Take 1 tablet by mouth every 6 (six) hours as needed., Disp: , Rfl: 0    lidocaine (LIDODERM) 5 %, Place 1 patch onto the skin once daily. Remove & Discard patch within 12 hours or as directed by MD, Disp: 12 patch, Rfl: 0    olmesartan (BENICAR) 20 MG tablet, Take 1 tablet (20 mg total) by mouth once daily., Disp: 90 tablet, Rfl: 3    OTEZLA 30 mg Tab, Take by mouth 2 (two) times daily. , Disp: , Rfl:     vitamin D 1000 units Tab, Take 185 mg by mouth once daily., Disp: , Rfl:     escitalopram oxalate (LEXAPRO) 20 MG tablet, Take 1 tablet (20 mg total) by mouth once daily., Disp: 90 tablet, Rfl: 3    icosapent ethyl (VASCEPA) 1 gram Cap, TAKE 2 CAPSULES BY MOUTH  TWO TIMES DAILY, Disp: 360 capsule, Rfl: 3    LYRICA 25 mg capsule, Take 25 mg by mouth every evening., Disp: , Rfl: 0    metFORMIN (GLUCOPHAGE) 1000 MG tablet, Take 1 tablet (1,000 mg total) by mouth daily with breakfast., Disp: 90 tablet, Rfl: 3    pantoprazole (PROTONIX) 40 MG tablet, Take 1 tablet (40 mg total) by mouth every other day., Disp: 45 tablet, Rfl: 3    Review of Systems   Constitution: Negative for chills, diaphoresis, fever, malaise/fatigue, night sweats and weight loss (DIET).   HENT: Negative for congestion and nosebleeds.    Eyes: Negative for blurred vision and  "visual disturbance.   Cardiovascular: Negative for chest pain, claudication, cyanosis, dyspnea on exertion (MINIMAL), irregular heartbeat, leg swelling, near-syncope, orthopnea, palpitations, paroxysmal nocturnal dyspnea and syncope.   Respiratory: Negative for cough, hemoptysis, shortness of breath and wheezing.         USES MOUTH PIECE FOR SLEEP APNEA   Endocrine: Negative for cold intolerance, heat intolerance and polyuria.   Hematologic/Lymphatic: Negative for adenopathy and bleeding problem. Does not bruise/bleed easily.   Skin: Negative for color change and rash.        PSORIASIS   Musculoskeletal: Negative for back pain, falls and stiffness. Joint pain: SOME.   Gastrointestinal: Negative for abdominal pain, change in bowel habit, dysphagia, heartburn, hematemesis, jaundice, melena and vomiting.   Genitourinary: Negative for dysuria, flank pain, frequency and nocturia.   Neurological: Negative for brief paralysis, dizziness, focal weakness, light-headedness, loss of balance, numbness, paresthesias, tremors and weakness.   Psychiatric/Behavioral: Negative for altered mental status, depression and memory loss. The patient is not nervous/anxious.    Allergic/Immunologic: Negative for environmental allergies and persistent infections.        Objective:      Vitals:    12/09/19 0755   BP: 122/77   Pulse: 81   Weight: 97.3 kg (214 lb 8.1 oz)   Height: 5' 10" (1.778 m)   PainSc: 0-No pain     Body mass index is 30.78 kg/m².    Physical Exam   Constitutional: He is oriented to person, place, and time. He appears well-developed and well-nourished. He is active.   HENT:   Head: Normocephalic and atraumatic.   Mouth/Throat: Oropharynx is clear and moist and mucous membranes are normal.   Eyes: Pupils are equal, round, and reactive to light. Conjunctivae and EOM are normal.   Neck: Normal range of motion. Neck supple. Normal carotid pulses, no hepatojugular reflux and no JVD present. Carotid bruit is not present. No " tracheal deviation, no edema and no erythema present. No thyromegaly present.   Cardiovascular: Normal rate and regular rhythm.  No extrasystoles are present. PMI is not displaced. Exam reveals no gallop, no distant heart sounds, no friction rub and no midsystolic click.   Murmur heard.   Systolic murmur is present with a grade of 1/6 at the lower left sternal border.  Pulses:       Carotid pulses are 2+ on the right side, and 2+ on the left side.       Radial pulses are 2+ on the right side, and 2+ on the left side.        Femoral pulses are 2+ on the right side with bruit, and 2+ on the left side.       Dorsalis pedis pulses are 2+ on the right side, and 2+ on the left side.        Posterior tibial pulses are 2+ on the right side, and 2+ on the left side.   Pulmonary/Chest: Effort normal and breath sounds normal. No accessory muscle usage. No tachypnea and no bradypnea. No respiratory distress.   Abdominal: Soft. Bowel sounds are normal. He exhibits no distension and no mass. There is no hepatosplenomegaly. There is no tenderness. There is no CVA tenderness.   Musculoskeletal: Normal range of motion. He exhibits no edema or deformity.   Lymphadenopathy:     He has no cervical adenopathy.   Neurological: He is alert and oriented to person, place, and time. He has normal strength. He displays no tremor. No cranial nerve deficit. Coordination normal.   Skin: Skin is warm and dry. No cyanosis or erythema. No pallor.   Psychiatric: He has a normal mood and affect. His speech is normal and behavior is normal.               ..    Chemistry        Component Value Date/Time     12/05/2019 0716     12/05/2019 0716    K 4.5 12/05/2019 0716    K 4.5 12/05/2019 0716     12/05/2019 0716     12/05/2019 0716    CO2 26 12/05/2019 0716    CO2 26 12/05/2019 0716    BUN 14 12/05/2019 0716    BUN 14 12/05/2019 0716    CREATININE 0.74 12/05/2019 0716    CREATININE 0.74 12/05/2019 0716     (H) 12/05/2019  0716     (H) 12/05/2019 0716        Component Value Date/Time    CALCIUM 9.5 12/05/2019 0716    CALCIUM 9.5 12/05/2019 0716    ALKPHOS 66 12/05/2019 0716    ALKPHOS 66 12/05/2019 0716    AST 28 12/05/2019 0716    AST 28 12/05/2019 0716    AST 33 12/02/2015 0716    ALT 46 (H) 12/05/2019 0716    ALT 46 (H) 12/05/2019 0716    BILITOT 0.8 12/05/2019 0716    BILITOT 0.8 12/05/2019 0716    ESTGFRAFRICA >60 12/05/2019 0716    ESTGFRAFRICA >60 12/05/2019 0716    EGFRNONAA >60 12/05/2019 0716    EGFRNONAA >60 12/05/2019 0716            ..  Lab Results   Component Value Date    CHOL 110 (L) 12/05/2019    CHOL 175 11/29/2018    CHOL 132 11/27/2017    CHOL 132 11/27/2017     Lab Results   Component Value Date    HDL 30 (L) 12/05/2019    HDL 38 (L) 11/29/2018    HDL 38 (L) 11/27/2017    HDL 38 (L) 11/27/2017     Lab Results   Component Value Date    LDLCALC 63.4 12/05/2019    LDLCALC 111.6 11/29/2018    LDLCALC 75.0 11/27/2017    LDLCALC 75.0 11/27/2017     Lab Results   Component Value Date    TRIG 83 12/05/2019    TRIG 127 11/29/2018    TRIG 95 11/27/2017    TRIG 95 11/27/2017     Lab Results   Component Value Date    CHOLHDL 27.3 12/05/2019    CHOLHDL 21.7 11/29/2018    CHOLHDL 28.8 11/27/2017    CHOLHDL 28.8 11/27/2017     ..  Lab Results   Component Value Date    WBC 8.28 12/05/2019    HGB 16.1 12/05/2019    HCT 49.7 12/05/2019    MCV 93 12/05/2019     12/05/2019       Test(s) Reviewed  I have reviewed the following in detail:  [x] Stress test   [] Angiography   [] Echocardiogram   [x] Labs   [] Other:       Assessment:         ICD-10-CM ICD-9-CM   1. Essential hypertension I10 401.9   2. Diastolic dysfunction I51.89 429.9   3. Non-rheumatic mitral regurgitation I34.0 424.0   4. Dyslipidemia, goal LDL below 100 E78.5 272.4   5. Pre-diabetes R73.03 790.29   6. Tobacco use Z72.0 305.1     Problem List Items Addressed This Visit        Cardiac/Vascular    Essential hypertension - Primary    Relevant Medications     olmesartan (BENICAR) 20 MG tablet    Other Relevant Orders    Comprehensive metabolic panel    Diastolic dysfunction    Relevant Orders    Comprehensive metabolic panel    Dyslipidemia, goal LDL below 100    Relevant Orders    Comprehensive metabolic panel    Lipid panel    Non-rheumatic mitral regurgitation    Relevant Orders    Comprehensive metabolic panel       Endocrine    Pre-diabetes    Relevant Orders    Comprehensive metabolic panel    Hemoglobin A1c       Other    Tobacco use    Relevant Orders    Ambulatory referral to Smoking Cessation Program    Comprehensive metabolic panel           Plan:     TOBACCO CESSATION COUNSELING,ALL CV CLINICALLY STABLE, NO ANGINA, NO HF, NO TIA, NO CLINICAL ARRHYTHMIA,CONTINUE CURRENT MEDS, EDUCATION, DIET, EXERCISE, RETURN TO CLINIC IN 9-12 MONTHS WITH LABS      Essential hypertension  -     Comprehensive metabolic panel; Future; Expected date: 12/09/2019  -     olmesartan (BENICAR) 20 MG tablet; Take 1 tablet (20 mg total) by mouth once daily.  Dispense: 90 tablet; Refill: 3    Diastolic dysfunction  -     Comprehensive metabolic panel; Future; Expected date: 12/09/2019    Non-rheumatic mitral regurgitation  -     Comprehensive metabolic panel; Future; Expected date: 12/09/2019    Dyslipidemia, goal LDL below 100  -     Comprehensive metabolic panel; Future; Expected date: 12/09/2019  -     Lipid panel; Future; Expected date: 12/09/2019    Pre-diabetes  -     Comprehensive metabolic panel; Future; Expected date: 12/09/2019  -     Hemoglobin A1c; Future; Expected date: 12/09/2019    Tobacco use  -     Ambulatory referral to Smoking Cessation Program  -     Comprehensive metabolic panel; Future; Expected date: 12/09/2019    Other orders  -     atorvastatin (LIPITOR) 40 MG tablet; Take 1 tablet (40 mg total) by mouth once daily.  Dispense: 90 tablet; Refill: 3    RTC Low level/low impact aerobic exercise 5x's/wk. Heart healthy diet and risk factor modification.    See labs  and med orders.    Aerobic exercise 5x's/wk. Heart healthy diet and risk factor modification.    See labs and med orders.

## 2020-01-14 DIAGNOSIS — I10 ESSENTIAL HYPERTENSION: ICD-10-CM

## 2020-01-14 RX ORDER — OLMESARTAN MEDOXOMIL 20 MG/1
20 TABLET ORAL DAILY
Qty: 90 TABLET | Refills: 3 | Status: SHIPPED | OUTPATIENT
Start: 2020-01-14 | End: 2020-12-07 | Stop reason: SDUPTHER

## 2020-01-14 NOTE — TELEPHONE ENCOUNTER
----- Message from Gretta Villalobos sent at 1/14/2020  9:32 AM CST -----  Type: Needs Medical Advice    Who Called:  Patient  Best Call Back Number: [phone] (home) [phone] (work)    Additional Information: Insurance is not covering the olmesartan (BENICAR) 20 MG tablet but they will cover the olmesartan. The prescription is not written for generic. Please remit the generic to:      "[facility] [employer] [facility] - [address]  [address]  [facility] [address]  Phone: [phone] Fax: [phone]    Please call patient to advise when completed.

## 2020-12-07 ENCOUNTER — OFFICE VISIT (OUTPATIENT)
Dept: CARDIOLOGY | Facility: CLINIC | Age: 50
End: 2020-12-07
Payer: COMMERCIAL

## 2020-12-07 VITALS
DIASTOLIC BLOOD PRESSURE: 71 MMHG | HEIGHT: 70 IN | BODY MASS INDEX: 30.39 KG/M2 | WEIGHT: 212.31 LBS | SYSTOLIC BLOOD PRESSURE: 110 MMHG | HEART RATE: 69 BPM

## 2020-12-07 DIAGNOSIS — I34.0 NON-RHEUMATIC MITRAL REGURGITATION: Primary | ICD-10-CM

## 2020-12-07 DIAGNOSIS — I34.0 NON-RHEUMATIC MITRAL REGURGITATION: ICD-10-CM

## 2020-12-07 DIAGNOSIS — E78.5 DYSLIPIDEMIA, GOAL LDL BELOW 100: ICD-10-CM

## 2020-12-07 DIAGNOSIS — R73.03 PRE-DIABETES: ICD-10-CM

## 2020-12-07 DIAGNOSIS — E66.9 OBESITY, CLASS I, BMI 30-34.9: ICD-10-CM

## 2020-12-07 DIAGNOSIS — Z72.0 TOBACCO USE: ICD-10-CM

## 2020-12-07 DIAGNOSIS — I10 ESSENTIAL HYPERTENSION: Primary | ICD-10-CM

## 2020-12-07 PROCEDURE — 1126F AMNT PAIN NOTED NONE PRSNT: CPT | Mod: S$GLB,,, | Performed by: INTERNAL MEDICINE

## 2020-12-07 PROCEDURE — 99999 PR PBB SHADOW E&M-EST. PATIENT-LVL IV: CPT | Mod: PBBFAC,,, | Performed by: INTERNAL MEDICINE

## 2020-12-07 PROCEDURE — 3078F DIAST BP <80 MM HG: CPT | Mod: CPTII,S$GLB,, | Performed by: INTERNAL MEDICINE

## 2020-12-07 PROCEDURE — 99214 OFFICE O/P EST MOD 30 MIN: CPT | Mod: S$GLB,,, | Performed by: INTERNAL MEDICINE

## 2020-12-07 PROCEDURE — 99214 PR OFFICE/OUTPT VISIT, EST, LEVL IV, 30-39 MIN: ICD-10-PCS | Mod: S$GLB,,, | Performed by: INTERNAL MEDICINE

## 2020-12-07 PROCEDURE — 1126F PR PAIN SEVERITY QUANTIFIED, NO PAIN PRESENT: ICD-10-PCS | Mod: S$GLB,,, | Performed by: INTERNAL MEDICINE

## 2020-12-07 PROCEDURE — 3008F PR BODY MASS INDEX (BMI) DOCUMENTED: ICD-10-PCS | Mod: CPTII,S$GLB,, | Performed by: INTERNAL MEDICINE

## 2020-12-07 PROCEDURE — 3074F PR MOST RECENT SYSTOLIC BLOOD PRESSURE < 130 MM HG: ICD-10-PCS | Mod: CPTII,S$GLB,, | Performed by: INTERNAL MEDICINE

## 2020-12-07 PROCEDURE — 3074F SYST BP LT 130 MM HG: CPT | Mod: CPTII,S$GLB,, | Performed by: INTERNAL MEDICINE

## 2020-12-07 PROCEDURE — 3008F BODY MASS INDEX DOCD: CPT | Mod: CPTII,S$GLB,, | Performed by: INTERNAL MEDICINE

## 2020-12-07 PROCEDURE — 3078F PR MOST RECENT DIASTOLIC BLOOD PRESSURE < 80 MM HG: ICD-10-PCS | Mod: CPTII,S$GLB,, | Performed by: INTERNAL MEDICINE

## 2020-12-07 PROCEDURE — 99999 PR PBB SHADOW E&M-EST. PATIENT-LVL IV: ICD-10-PCS | Mod: PBBFAC,,, | Performed by: INTERNAL MEDICINE

## 2020-12-07 RX ORDER — OLMESARTAN MEDOXOMIL 20 MG/1
20 TABLET ORAL DAILY
Qty: 90 TABLET | Refills: 3 | Status: SHIPPED | OUTPATIENT
Start: 2020-12-07 | End: 2021-12-14 | Stop reason: SDUPTHER

## 2020-12-07 RX ORDER — ATORVASTATIN CALCIUM 40 MG/1
40 TABLET, FILM COATED ORAL DAILY
Qty: 90 TABLET | Refills: 3 | Status: SHIPPED | OUTPATIENT
Start: 2020-12-07 | End: 2021-12-14 | Stop reason: SDUPTHER

## 2020-12-07 RX ORDER — ICOSAPENT ETHYL 1000 MG/1
CAPSULE ORAL
Qty: 360 CAPSULE | Refills: 3 | Status: SHIPPED | OUTPATIENT
Start: 2020-12-07 | End: 2021-12-14

## 2020-12-07 NOTE — PROGRESS NOTES
Subjective:    Patient ID:  William Winslow is a 50 y.o. male who presents for Hypertension (labs) and Hyperlipidemia        HPI    DISCUSSED LABS AND GOALS LDL 83, HBA1C 5.7%, CMP OK, STILL SMOKES 1 PPD, NO CHEST PAIN NO SIGNIFICANT SHORTNESS OF BREATH NO TIA TYPE SYMPTOMS NO NEAR-SYNCOPE, SEE ROS  Past Medical History:   Diagnosis Date    Cervical herniated disc     Chicken pox     GERD (gastroesophageal reflux disease)     Hyperlipidemia     Hypertension     Insomnia     Psoriasis     Sleep apnea     Stroke     Type 2 diabetes mellitus without complication 10/6/2017     Past Surgical History:   Procedure Laterality Date    MOUTH SURGERY      NASAL SEPTUM SURGERY       Family History   Problem Relation Age of Onset    Heart disease Father      Social History     Socioeconomic History    Marital status:      Spouse name: Not on file    Number of children: Not on file    Years of education: Not on file    Highest education level: Not on file   Occupational History    Not on file   Social Needs    Financial resource strain: Not on file    Food insecurity     Worry: Not on file     Inability: Not on file    Transportation needs     Medical: Not on file     Non-medical: Not on file   Tobacco Use    Smoking status: Current Every Day Smoker     Packs/day: 1.00     Types: Cigarettes    Smokeless tobacco: Never Used   Substance and Sexual Activity    Alcohol use: No    Drug use: No    Sexual activity: Not on file   Lifestyle    Physical activity     Days per week: Not on file     Minutes per session: Not on file    Stress: Not on file   Relationships    Social connections     Talks on phone: Not on file     Gets together: Not on file     Attends Congregational service: Not on file     Active member of club or organization: Not on file     Attends meetings of clubs or organizations: Not on file     Relationship status: Not on file   Other Topics Concern    Not on file   Social History  Narrative    Not on file       Review of patient's allergies indicates:  No Known Allergies    Current Outpatient Medications:     ascorbic acid (VITAMIN C) 1000 MG tablet, Take 1,000 mg by mouth once daily., Disp: , Rfl:     aspirin 81 MG Chew, Take 81 mg by mouth once daily., Disp: , Rfl:     atorvastatin (LIPITOR) 40 MG tablet, Take 1 tablet (40 mg total) by mouth once daily., Disp: 90 tablet, Rfl: 3    clobetasol 0.05% (TEMOVATE) 0.05 % Oint, APPLY TO THE SKIN TWICE DAILY. APPLY TO ELBOWS, Disp: , Rfl:     COQ10, LIPOSOMAL UBIQUINOL, ORAL, Take 1 capsule by mouth once daily., Disp: , Rfl:     escitalopram oxalate (LEXAPRO) 20 MG tablet, Take 1 tablet (20 mg total) by mouth once daily., Disp: 90 tablet, Rfl: 3    gabapentin (NEURONTIN) 100 MG capsule, Take 100 mg by mouth as needed. , Disp: , Rfl:     HYDROcodone-acetaminophen (NORCO) 5-325 mg per tablet, Take 1 tablet by mouth every 6 (six) hours as needed., Disp: , Rfl: 0    icosapent ethyL (VASCEPA) 1 gram Cap, TAKE 2 CAPSULES BY MOUTH  TWO TIMES DAILY, Disp: 360 capsule, Rfl: 3    LYRICA 25 mg capsule, Take 25 mg by mouth every evening., Disp: , Rfl: 0    metFORMIN (GLUCOPHAGE) 1000 MG tablet, Take 1 tablet (1,000 mg total) by mouth daily with breakfast., Disp: 90 tablet, Rfl: 3    olmesartan (BENICAR) 20 MG tablet, Take 1 tablet (20 mg total) by mouth once daily., Disp: 90 tablet, Rfl: 3    OTEZLA 30 mg Tab, Take by mouth 2 (two) times daily. , Disp: , Rfl:     pantoprazole (PROTONIX) 40 MG tablet, Take 1 tablet (40 mg total) by mouth every other day., Disp: 45 tablet, Rfl: 3    vitamin D 1000 units Tab, Take 185 mg by mouth once daily., Disp: , Rfl:     lidocaine (LIDODERM) 5 %, Place 1 patch onto the skin once daily. Remove & Discard patch within 12 hours or as directed by MD (Patient not taking: Reported on 2/21/2020), Disp: 12 patch, Rfl: 0    Review of Systems   Constitution: Negative for chills, diaphoresis, fever, malaise/fatigue and  "night sweats.   HENT: Negative for congestion and nosebleeds.    Eyes: Negative for blurred vision and visual disturbance.   Cardiovascular: Negative for chest pain, claudication, cyanosis, dyspnea on exertion (MINIMAL), irregular heartbeat, leg swelling, near-syncope, orthopnea, palpitations, paroxysmal nocturnal dyspnea and syncope.   Respiratory: Negative for cough, hemoptysis, shortness of breath and wheezing.    Endocrine: Negative for polydipsia and polyuria.   Hematologic/Lymphatic: Negative for adenopathy. Does not bruise/bleed easily.   Skin: Negative for color change and rash.        PSORIASIS   Musculoskeletal: Negative for back pain (CHRONIC) and falls.   Gastrointestinal: Negative for abdominal pain, change in bowel habit, dysphagia, heartburn, jaundice, melena and nausea.   Genitourinary: Negative for dysuria, flank pain and frequency.   Neurological: Negative for brief paralysis, dizziness, focal weakness, light-headedness, loss of balance, numbness, tremors and weakness.   Psychiatric/Behavioral: Negative for altered mental status, depression and memory loss. The patient is not nervous/anxious.    Allergic/Immunologic: Negative for persistent infections.        Objective:      Vitals:    12/07/20 0802   BP: 110/71   Pulse: 69   Weight: 96.3 kg (212 lb 4.9 oz)   Height: 5' 10" (1.778 m)   PainSc: 0-No pain     Body mass index is 30.46 kg/m².    Physical Exam   Constitutional: He is oriented to person, place, and time. He appears well-developed and well-nourished. He is active.   HENT:   Head: Normocephalic and atraumatic.   Mouth/Throat: Mucous membranes are normal.   Eyes: Pupils are equal, round, and reactive to light. Conjunctivae are normal.   Neck: Neck supple. Normal carotid pulses, no hepatojugular reflux and no JVD present. Carotid bruit is not present.   Cardiovascular: Normal rate and regular rhythm.  No extrasystoles are present. PMI is not displaced. Exam reveals no gallop, no distant " heart sounds, no friction rub and no midsystolic click.   Murmur heard.   Systolic murmur is present with a grade of 1/6 at the apex.  Pulses:       Carotid pulses are 2+ on the right side and 2+ on the left side.       Radial pulses are 2+ on the right side and 2+ on the left side.        Femoral pulses are 2+ on the right side with bruit and 2+ on the left side.       Posterior tibial pulses are 2+ on the right side and 2+ on the left side.   Pulmonary/Chest: Effort normal and breath sounds normal. No respiratory distress. He has no decreased breath sounds. He has no wheezes. He has no rhonchi. He has no rales.   Abdominal: Soft. There is no hepatosplenomegaly. There is no abdominal tenderness. There is no CVA tenderness.   Musculoskeletal: Normal range of motion.         General: No deformity or edema.      Comments: NO ANKLE EDEMA   Neurological: He is alert and oriented to person, place, and time. He displays no tremor. No cranial nerve deficit.   Skin: Skin is warm and dry. No rash noted.   Psychiatric: He has a normal mood and affect. His speech is normal and behavior is normal.               ..    Chemistry        Component Value Date/Time     12/03/2020 0709    K 4.7 12/03/2020 0709     12/03/2020 0709    CO2 26 12/03/2020 0709    BUN 19 12/03/2020 0709    CREATININE 0.77 12/03/2020 0709     12/03/2020 0709        Component Value Date/Time    CALCIUM 9.1 12/03/2020 0709    ALKPHOS 64 12/03/2020 0709    AST 35 12/03/2020 0709    AST 33 12/02/2015 0716    ALT 30 12/03/2020 0709    BILITOT 0.7 12/03/2020 0709    ESTGFRAFRICA >60 12/03/2020 0709    EGFRNONAA >60 12/03/2020 0709            ..  Lab Results   Component Value Date    CHOL 136 12/03/2020    CHOL 110 (L) 12/05/2019    CHOL 175 11/29/2018     Lab Results   Component Value Date    HDL 42 12/03/2020    HDL 30 (L) 12/05/2019    HDL 38 (L) 11/29/2018     Lab Results   Component Value Date    LDLCALC 83.0 12/03/2020    LDLCALC 63.4  12/05/2019    LDLCALC 111.6 11/29/2018     Lab Results   Component Value Date    TRIG 55 12/03/2020    TRIG 83 12/05/2019    TRIG 127 11/29/2018     Lab Results   Component Value Date    CHOLHDL 30.9 12/03/2020    CHOLHDL 27.3 12/05/2019    CHOLHDL 21.7 11/29/2018     ..  Lab Results   Component Value Date    WBC 7.42 06/19/2020    HGB 15.3 06/19/2020    HCT 46.4 06/19/2020    MCV 93 06/19/2020     06/19/2020       Test(s) Reviewed  I have reviewed the following in detail:  [] Stress test   [] Angiography   [] Echocardiogram   [x] Labs   [] Other:       Assessment:         ICD-10-CM ICD-9-CM   1. Essential hypertension  I10 401.9   2. Non-rheumatic mitral regurgitation  I34.0 424.0   3. Dyslipidemia, goal LDL below 100  E78.5 272.4   4. Obesity, Class I, BMI 30-34.9  E66.9 278.00   5. Pre-diabetes  R73.03 790.29   6. Tobacco use  Z72.0 305.1     Problem List Items Addressed This Visit        Cardiac/Vascular    Essential hypertension - Primary    Relevant Medications    olmesartan (BENICAR) 20 MG tablet    Other Relevant Orders    CV Segmental Pressures Low Ext WO Stress    Comprehensive Metabolic Panel    Dyslipidemia, goal LDL below 100    Relevant Medications    icosapent ethyL (VASCEPA) 1 gram Cap    Other Relevant Orders    Comprehensive Metabolic Panel    Lipid Panel    Non-rheumatic mitral regurgitation    Relevant Orders    Comprehensive Metabolic Panel       Endocrine    Obesity, Class I, BMI 30-34.9    Pre-diabetes    Relevant Orders    CV Segmental Pressures Low Ext WO Stress    Comprehensive Metabolic Panel    Hemoglobin A1C       Other    Tobacco use    Relevant Orders    CV Segmental Pressures Low Ext WO Stress           Plan:     TOBACCO CESSATION COUNSELING,ALL CV CLINICALLY STABLE, NO ANGINA, NO HF, NO TIA, NO CLINICAL ARRHYTHMIA,CONTINUE CURRENT MEDS, EDUCATION, DIET, EXERCISE,,WEIGHT LOSS, RETURN TO CLINIC IN 1 Y WITH LABS, ALSO CHECK JADE TO ASSESS CIRCULATION      Essential hypertension  -      CV Segmental Pressures Low Ext WO Stress; Future; Expected date: 06/07/2021  -     olmesartan (BENICAR) 20 MG tablet; Take 1 tablet (20 mg total) by mouth once daily.  Dispense: 90 tablet; Refill: 3  -     Comprehensive Metabolic Panel; Future; Expected date: 12/07/2021    Non-rheumatic mitral regurgitation  -     Comprehensive Metabolic Panel; Future; Expected date: 12/07/2021    Dyslipidemia, goal LDL below 100  -     icosapent ethyL (VASCEPA) 1 gram Cap; TAKE 2 CAPSULES BY MOUTH  TWO TIMES DAILY  Dispense: 360 capsule; Refill: 3  -     Comprehensive Metabolic Panel; Future; Expected date: 12/07/2021  -     Lipid Panel; Future; Expected date: 12/07/2021    Obesity, Class I, BMI 30-34.9    Pre-diabetes  -     CV Segmental Pressures Low Ext WO Stress; Future; Expected date: 06/07/2021  -     Comprehensive Metabolic Panel; Future; Expected date: 12/07/2021  -     Hemoglobin A1C; Future; Expected date: 12/07/2021    Tobacco use  -     CV Segmental Pressures Low Ext WO Stress; Future; Expected date: 06/07/2021    Other orders  -     atorvastatin (LIPITOR) 40 MG tablet; Take 1 tablet (40 mg total) by mouth once daily.  Dispense: 90 tablet; Refill: 3    RTC Low level/low impact aerobic exercise 5x's/wk. Heart healthy diet and risk factor modification.    See labs and med orders.    Aerobic exercise 5x's/wk. Heart healthy diet and risk factor modification.    See labs and med orders.

## 2020-12-11 PROBLEM — K21.9 GERD WITHOUT ESOPHAGITIS: Status: ACTIVE | Noted: 2020-12-11

## 2020-12-11 PROBLEM — E78.6 LOW HDL (UNDER 40): Status: ACTIVE | Noted: 2020-12-11

## 2020-12-11 PROBLEM — Z00.00 WELL ADULT EXAM: Status: ACTIVE | Noted: 2020-12-11

## 2021-03-15 PROBLEM — Z00.00 WELL ADULT EXAM: Status: RESOLVED | Noted: 2020-12-11 | Resolved: 2021-03-15

## 2021-08-06 ENCOUNTER — IMMUNIZATION (OUTPATIENT)
Dept: FAMILY MEDICINE | Facility: CLINIC | Age: 51
End: 2021-08-06
Payer: COMMERCIAL

## 2021-08-06 DIAGNOSIS — Z23 NEED FOR VACCINATION: Primary | ICD-10-CM

## 2021-08-06 PROCEDURE — 0001A COVID-19, MRNA, LNP-S, PF, 30 MCG/0.3 ML DOSE VACCINE: ICD-10-PCS | Mod: CV19,,, | Performed by: FAMILY MEDICINE

## 2021-08-06 PROCEDURE — 91300 COVID-19, MRNA, LNP-S, PF, 30 MCG/0.3 ML DOSE VACCINE: ICD-10-PCS | Mod: ,,, | Performed by: FAMILY MEDICINE

## 2021-08-06 PROCEDURE — 91300 COVID-19, MRNA, LNP-S, PF, 30 MCG/0.3 ML DOSE VACCINE: CPT | Mod: ,,, | Performed by: FAMILY MEDICINE

## 2021-08-06 PROCEDURE — 0001A COVID-19, MRNA, LNP-S, PF, 30 MCG/0.3 ML DOSE VACCINE: CPT | Mod: CV19,,, | Performed by: FAMILY MEDICINE

## 2021-09-03 ENCOUNTER — IMMUNIZATION (OUTPATIENT)
Dept: FAMILY MEDICINE | Facility: CLINIC | Age: 51
End: 2021-09-03
Payer: COMMERCIAL

## 2021-09-03 DIAGNOSIS — Z23 NEED FOR VACCINATION: Primary | ICD-10-CM

## 2021-09-03 PROCEDURE — 91300 COVID-19, MRNA, LNP-S, PF, 30 MCG/0.3 ML DOSE VACCINE: ICD-10-PCS | Mod: ,,, | Performed by: FAMILY MEDICINE

## 2021-09-03 PROCEDURE — 0002A COVID-19, MRNA, LNP-S, PF, 30 MCG/0.3 ML DOSE VACCINE: CPT | Mod: CV19,,, | Performed by: FAMILY MEDICINE

## 2021-09-03 PROCEDURE — 0002A COVID-19, MRNA, LNP-S, PF, 30 MCG/0.3 ML DOSE VACCINE: ICD-10-PCS | Mod: CV19,,, | Performed by: FAMILY MEDICINE

## 2021-09-03 PROCEDURE — 91300 COVID-19, MRNA, LNP-S, PF, 30 MCG/0.3 ML DOSE VACCINE: CPT | Mod: ,,, | Performed by: FAMILY MEDICINE

## 2021-09-20 ENCOUNTER — TELEPHONE (OUTPATIENT)
Dept: CARDIOLOGY | Facility: CLINIC | Age: 51
End: 2021-09-20

## 2021-09-29 ENCOUNTER — TELEPHONE (OUTPATIENT)
Dept: CARDIOLOGY | Facility: CLINIC | Age: 51
End: 2021-09-29

## 2021-09-29 ENCOUNTER — PATIENT MESSAGE (OUTPATIENT)
Dept: CARDIOLOGY | Facility: CLINIC | Age: 51
End: 2021-09-29

## 2021-11-24 ENCOUNTER — TELEPHONE (OUTPATIENT)
Dept: CARDIOLOGY | Facility: CLINIC | Age: 51
End: 2021-11-24
Payer: COMMERCIAL

## 2021-11-29 ENCOUNTER — TELEPHONE (OUTPATIENT)
Dept: CARDIOLOGY | Facility: CLINIC | Age: 51
End: 2021-11-29
Payer: COMMERCIAL

## 2021-12-06 ENCOUNTER — CLINICAL SUPPORT (OUTPATIENT)
Dept: CARDIOLOGY | Facility: HOSPITAL | Age: 51
End: 2021-12-06
Attending: INTERNAL MEDICINE
Payer: COMMERCIAL

## 2021-12-06 VITALS — BODY MASS INDEX: 29.78 KG/M2 | WEIGHT: 208 LBS | HEIGHT: 70 IN

## 2021-12-06 DIAGNOSIS — I34.0 NON-RHEUMATIC MITRAL REGURGITATION: ICD-10-CM

## 2021-12-06 LAB
LEFT ABI: 1.3
LEFT ARM BP: 112 MMHG
LEFT DORSALIS PEDIS: 133 MMHG
LEFT POSTERIOR TIBIAL: 146 MMHG
LEFT TBI: 0.92
LEFT TOE PRESSURE: 103 MMHG
RIGHT ABI: 1.33
RIGHT ARM BP: 109 MMHG
RIGHT DORSALIS PEDIS: 149 MMHG
RIGHT POSTERIOR TIBIAL: 144 MMHG
RIGHT TBI: 1.05
RIGHT TOE PRESSURE: 118 MMHG

## 2021-12-06 PROCEDURE — 93922 UPR/L XTREMITY ART 2 LEVELS: CPT | Mod: PO

## 2021-12-06 PROCEDURE — 93922 UPR/L XTREMITY ART 2 LEVELS: CPT | Mod: 26,,, | Performed by: INTERNAL MEDICINE

## 2021-12-06 PROCEDURE — 93922 ANKLE BRACHIAL INDICES (ABI): ICD-10-PCS | Mod: 26,,, | Performed by: INTERNAL MEDICINE

## 2021-12-14 PROBLEM — Z00.00 ANNUAL PHYSICAL EXAM: Status: ACTIVE | Noted: 2021-12-14

## 2022-01-31 ENCOUNTER — OFFICE VISIT (OUTPATIENT)
Dept: CARDIOLOGY | Facility: CLINIC | Age: 52
End: 2022-01-31
Payer: COMMERCIAL

## 2022-01-31 VITALS
BODY MASS INDEX: 31.09 KG/M2 | SYSTOLIC BLOOD PRESSURE: 122 MMHG | DIASTOLIC BLOOD PRESSURE: 76 MMHG | HEIGHT: 70 IN | HEART RATE: 67 BPM | WEIGHT: 217.13 LBS

## 2022-01-31 DIAGNOSIS — I10 ESSENTIAL HYPERTENSION: Chronic | ICD-10-CM

## 2022-01-31 DIAGNOSIS — Z72.0 TOBACCO USE: Chronic | ICD-10-CM

## 2022-01-31 DIAGNOSIS — I34.0 NON-RHEUMATIC MITRAL REGURGITATION: Chronic | ICD-10-CM

## 2022-01-31 DIAGNOSIS — E66.9 OBESITY, CLASS I, BMI 30-34.9: Chronic | ICD-10-CM

## 2022-01-31 DIAGNOSIS — R07.2 PRECORDIAL PAIN: Primary | ICD-10-CM

## 2022-01-31 DIAGNOSIS — E78.5 DYSLIPIDEMIA (HIGH LDL; LOW HDL): Chronic | ICD-10-CM

## 2022-01-31 PROCEDURE — 3074F PR MOST RECENT SYSTOLIC BLOOD PRESSURE < 130 MM HG: ICD-10-PCS | Mod: CPTII,S$GLB,, | Performed by: INTERNAL MEDICINE

## 2022-01-31 PROCEDURE — 3078F DIAST BP <80 MM HG: CPT | Mod: CPTII,S$GLB,, | Performed by: INTERNAL MEDICINE

## 2022-01-31 PROCEDURE — 3074F SYST BP LT 130 MM HG: CPT | Mod: CPTII,S$GLB,, | Performed by: INTERNAL MEDICINE

## 2022-01-31 PROCEDURE — 3008F PR BODY MASS INDEX (BMI) DOCUMENTED: ICD-10-PCS | Mod: CPTII,S$GLB,, | Performed by: INTERNAL MEDICINE

## 2022-01-31 PROCEDURE — 1159F MED LIST DOCD IN RCRD: CPT | Mod: CPTII,S$GLB,, | Performed by: INTERNAL MEDICINE

## 2022-01-31 PROCEDURE — 99999 PR PBB SHADOW E&M-EST. PATIENT-LVL IV: CPT | Mod: PBBFAC,,, | Performed by: INTERNAL MEDICINE

## 2022-01-31 PROCEDURE — 1159F PR MEDICATION LIST DOCUMENTED IN MEDICAL RECORD: ICD-10-PCS | Mod: CPTII,S$GLB,, | Performed by: INTERNAL MEDICINE

## 2022-01-31 PROCEDURE — 99999 PR PBB SHADOW E&M-EST. PATIENT-LVL IV: ICD-10-PCS | Mod: PBBFAC,,, | Performed by: INTERNAL MEDICINE

## 2022-01-31 PROCEDURE — 3008F BODY MASS INDEX DOCD: CPT | Mod: CPTII,S$GLB,, | Performed by: INTERNAL MEDICINE

## 2022-01-31 PROCEDURE — 3078F PR MOST RECENT DIASTOLIC BLOOD PRESSURE < 80 MM HG: ICD-10-PCS | Mod: CPTII,S$GLB,, | Performed by: INTERNAL MEDICINE

## 2022-01-31 PROCEDURE — 99214 PR OFFICE/OUTPT VISIT, EST, LEVL IV, 30-39 MIN: ICD-10-PCS | Mod: S$GLB,,, | Performed by: INTERNAL MEDICINE

## 2022-01-31 PROCEDURE — 99214 OFFICE O/P EST MOD 30 MIN: CPT | Mod: S$GLB,,, | Performed by: INTERNAL MEDICINE

## 2022-01-31 RX ORDER — ROSUVASTATIN CALCIUM 20 MG/1
20 TABLET, COATED ORAL NIGHTLY
Qty: 90 TABLET | Refills: 1 | Status: SHIPPED | OUTPATIENT
Start: 2022-01-31 | End: 2022-07-19

## 2022-01-31 NOTE — PROGRESS NOTES
Subjective:    Patient ID:  William Winslow is a 51 y.o. male who presents for Hypertension        HPI    DISCUSSED LABS AND GOALS LDL 89 HDL 29 CMP OK, HB A1C 6.2%, DOING OK SOME CHEST PAIN,, STILL SMOKES, JADE OK, SEE ROS  Past Medical History:   Diagnosis Date    Cervical herniated disc     Insomnia     Psoriasis     Sleep apnea     Type 2 diabetes mellitus without complication 10/6/2017     Past Surgical History:   Procedure Laterality Date    MOUTH SURGERY      NASAL SEPTUM SURGERY       Family History   Problem Relation Age of Onset    Heart disease Father      Social History     Socioeconomic History    Marital status:    Tobacco Use    Smoking status: Current Every Day Smoker     Packs/day: 1.00     Types: Cigarettes    Smokeless tobacco: Never Used   Substance and Sexual Activity    Alcohol use: No    Drug use: No       Review of patient's allergies indicates:  No Known Allergies    Current Outpatient Medications:     ascorbic acid, vitamin C, (VITAMIN C) 1000 MG tablet, Take 1,000 mg by mouth once daily., Disp: , Rfl:     aspirin 81 MG Chew, Take 81 mg by mouth once daily., Disp: , Rfl:     clobetasol 0.05% (TEMOVATE) 0.05 % Oint, APPLY TO THE SKIN TWICE DAILY. APPLY TO ELBOWS, Disp: , Rfl:     COQ10, LIPOSOMAL UBIQUINOL, ORAL, Take 1 capsule by mouth once daily., Disp: , Rfl:     EScitalopram oxalate (LEXAPRO) 20 MG tablet, Take 1 tablet (20 mg total) by mouth once daily., Disp: 90 tablet, Rfl: 3    LYRICA 25 mg capsule, Take 25 mg by mouth every evening. PRN, Disp: , Rfl: 0    metFORMIN (GLUCOPHAGE) 1000 MG tablet, Take 1 tablet (1,000 mg total) by mouth daily with breakfast., Disp: 90 tablet, Rfl: 3    olmesartan (BENICAR) 20 MG tablet, Take 1 tablet (20 mg total) by mouth once daily., Disp: 90 tablet, Rfl: 3    OMEGA-3-DHA-EPA-DPA ORAL, Take by mouth 2 (two) times a day., Disp: , Rfl:     OTEZLA 30 mg Tab, Take by mouth 2 (two) times daily. , Disp: , Rfl:      "pantoprazole (PROTONIX) 40 MG tablet, Take 1 tablet (40 mg total) by mouth once daily., Disp: 90 tablet, Rfl: 3    vitamin D 1000 units Tab, Take 185 mg by mouth once daily., Disp: , Rfl:     lidocaine (LIDODERM) 5 %, Place 1 patch onto the skin once daily. Remove & Discard patch within 12 hours or as directed by MD, Disp: 12 patch, Rfl: 0    rosuvastatin (CRESTOR) 20 MG tablet, Take 1 tablet (20 mg total) by mouth every evening., Disp: 90 tablet, Rfl: 1    Review of Systems   Constitutional: Negative for chills, diaphoresis, fever, malaise/fatigue and night sweats.   HENT: Negative for congestion and nosebleeds.    Eyes: Negative for blurred vision and visual disturbance.   Cardiovascular: Negative for chest pain (RARE FLEETING), claudication, cyanosis, dyspnea on exertion (MINIMAL), irregular heartbeat, leg swelling, near-syncope, orthopnea, palpitations, paroxysmal nocturnal dyspnea and syncope.   Respiratory: Negative for cough, hemoptysis, shortness of breath and wheezing.    Endocrine: Negative for polydipsia and polyuria.   Hematologic/Lymphatic: Negative for adenopathy. Does not bruise/bleed easily.   Skin: Negative for color change and rash.        PSORIASIS   Musculoskeletal: Positive for arthritis (PSORIASIS). Negative for back pain (CHRONIC) and falls.   Gastrointestinal: Negative for abdominal pain, change in bowel habit, dysphagia, heartburn, jaundice, melena and nausea.   Genitourinary: Negative for dysuria and flank pain.   Neurological: Negative for brief paralysis, dizziness, focal weakness, light-headedness and weakness.   Psychiatric/Behavioral: Negative for altered mental status, depression and memory loss. The patient is not nervous/anxious.    Allergic/Immunologic: Negative.  Negative for persistent infections.        Objective:      Vitals:    01/31/22 0901   BP: 122/76   Pulse: 67   Weight: 98.5 kg (217 lb 2.5 oz)   Height: 5' 10" (1.778 m)   PainSc: 0-No pain     Body mass index is 31.16 " kg/m².    Physical Exam  Constitutional:       Appearance: He is well-developed. He is obese.   HENT:      Head: Normocephalic and atraumatic.   Eyes:      General: No scleral icterus.     Extraocular Movements: Extraocular movements intact.      Conjunctiva/sclera: Conjunctivae normal.   Neck:      Vascular: Normal carotid pulses. No carotid bruit, hepatojugular reflux or JVD.   Cardiovascular:      Rate and Rhythm: Normal rate and regular rhythm.  No extrasystoles are present.     Pulses: No midsystolic click.           Carotid pulses are 2+ on the right side and 2+ on the left side.       Radial pulses are 2+ on the right side and 2+ on the left side.        Femoral pulses are 2+ on the right side and 2+ on the left side.       Posterior tibial pulses are 2+ on the right side and 2+ on the left side.      Heart sounds: Murmur heard.    Systolic murmur is present with a grade of 1/6 at the lower left sternal border and apex.  No friction rub. No gallop.    Pulmonary:      Effort: Pulmonary effort is normal.      Breath sounds: Normal breath sounds. No decreased breath sounds, rhonchi or rales.   Abdominal:      Palpations: Abdomen is soft.      Tenderness: There is no abdominal tenderness.   Musculoskeletal:         General: Normal range of motion.      Cervical back: Neck supple.      Right lower leg: No edema.      Left lower leg: No edema.   Skin:     General: Skin is warm and dry.      Findings: No rash.   Neurological:      Mental Status: He is alert and oriented to person, place, and time.      Cranial Nerves: No cranial nerve deficit.      Motor: No tremor.   Psychiatric:         Mood and Affect: Mood normal.         Speech: Speech normal.         Behavior: Behavior normal.                 ..    Chemistry        Component Value Date/Time     12/10/2021 0710     12/10/2021 0710    K 4.8 12/10/2021 0710    K 4.8 12/10/2021 0710     12/10/2021 0710     12/10/2021 0710    CO2 29  12/10/2021 0710    CO2 29 12/10/2021 0710    BUN 14 12/10/2021 0710    BUN 14 12/10/2021 0710    CREATININE 0.82 12/10/2021 0710    CREATININE 0.82 12/10/2021 0710     (H) 12/10/2021 0710     (H) 12/10/2021 0710        Component Value Date/Time    CALCIUM 9.5 12/10/2021 0710    CALCIUM 9.5 12/10/2021 0710    ALKPHOS 76 12/10/2021 0710    ALKPHOS 76 12/10/2021 0710    AST 33 12/10/2021 0710    AST 33 12/10/2021 0710    AST 33 12/02/2015 0716    ALT 33 12/10/2021 0710    ALT 33 12/10/2021 0710    BILITOT 0.4 12/10/2021 0710    BILITOT 0.4 12/10/2021 0710    ESTGFRAFRICA >60 12/10/2021 0710    ESTGFRAFRICA >60 12/10/2021 0710    EGFRNONAA >60 12/10/2021 0710    EGFRNONAA >60 12/10/2021 0710            ..  Lab Results   Component Value Date    CHOL 147 12/10/2021    CHOL 147 12/10/2021    CHOL 136 12/03/2020     Lab Results   Component Value Date    HDL 29 (L) 12/10/2021    HDL 29 (L) 12/10/2021    HDL 42 12/03/2020     Lab Results   Component Value Date    LDLCALC 89.0 12/10/2021    LDLCALC 89.0 12/10/2021    LDLCALC 83.0 12/03/2020     Lab Results   Component Value Date    TRIG 145 12/10/2021    TRIG 145 12/10/2021    TRIG 55 12/03/2020     Lab Results   Component Value Date    CHOLHDL 19.7 (L) 12/10/2021    CHOLHDL 19.7 (L) 12/10/2021    CHOLHDL 30.9 12/03/2020     ..  Lab Results   Component Value Date    WBC 8.26 03/25/2021    HGB 15.4 03/25/2021    HCT 47.3 03/25/2021    MCV 93 03/25/2021     03/25/2021       Test(s) Reviewed  I have reviewed the following in detail:  [] Stress test   [] Angiography   [] Echocardiogram   [x] Labs   [] Other:       Assessment:         ICD-10-CM ICD-9-CM   1. Precordial pain  R07.2 786.51   2. Non-rheumatic mitral regurgitation  I34.0 424.0   3. Tobacco use  Z72.0 305.1   4. Essential hypertension  I10 401.9   5. Dyslipidemia (high LDL; low HDL)  E78.5 272.4   6. Obesity, Class I, BMI 30-34.9  E66.9 278.00     Problem List Items Addressed This Visit         Cardiac/Vascular    Essential hypertension    Relevant Orders    Comprehensive Metabolic Panel    Non-rheumatic mitral regurgitation    Relevant Orders    Stress Echo Which stress agent will be used? Treadmill Exercise; Color Flow Doppler? Yes    Dyslipidemia (high LDL; low HDL)    Relevant Orders    Comprehensive Metabolic Panel    Lipid Panel       Endocrine    Obesity, Class I, BMI 30-34.9       Other    Tobacco use    Precordial pain - Primary    Relevant Orders    Stress Echo Which stress agent will be used? Treadmill Exercise; Color Flow Doppler? Yes           Plan:     NEEDS FURTHER EVALUATION, CHECK STRESS ECHO, TOBACCO CESSATION, COUNSELING CHANGE LIPITOR TO CRESTOR, FOR BETTER LIPID PROFILE SPECIALLY IN VIEW OF FLOW HDL, EXPLAINED RELATION TO TOBACCO USE,ALL OTHER CV CLINICALLY STABLE, NO CLEAR ANGINA, NO HF, NO TIA, NO CLINICAL ARRHYTHMIA,CONTINUE CURRENT MEDS, EDUCATION, DIET, EXERCISE, WEIGHT LOSS RETURN TO CLINIC IN 6 MO WITH LABS      Precordial pain  Comments:  EVALUATE  Orders:  -     Stress Echo Which stress agent will be used? Treadmill Exercise; Color Flow Doppler? Yes; Future    Non-rheumatic mitral regurgitation  -     Stress Echo Which stress agent will be used? Treadmill Exercise; Color Flow Doppler? Yes; Future    Tobacco use  Comments:  COUNSELING    Essential hypertension  Comments:  CONTROLLED  Orders:  -     Comprehensive Metabolic Panel; Future; Expected date: 07/31/2022    Dyslipidemia (high LDL; low HDL)  -     Comprehensive Metabolic Panel; Future; Expected date: 07/31/2022  -     Lipid Panel; Future; Expected date: 07/31/2022    Obesity, Class I, BMI 30-34.9  Comments:  WEIGHT LOSS    Other orders  -     rosuvastatin (CRESTOR) 20 MG tablet; Take 1 tablet (20 mg total) by mouth every evening.  Dispense: 90 tablet; Refill: 1    RTC Low level/low impact aerobic exercise 5x's/wk. Heart healthy diet and risk factor modification.    See labs and med orders.    Aerobic exercise 5x's/wk.  Heart healthy diet and risk factor modification.    See labs and med orders.

## 2022-02-07 ENCOUNTER — CLINICAL SUPPORT (OUTPATIENT)
Dept: CARDIOLOGY | Facility: HOSPITAL | Age: 52
End: 2022-02-07
Attending: INTERNAL MEDICINE
Payer: COMMERCIAL

## 2022-02-07 VITALS — WEIGHT: 217 LBS | BODY MASS INDEX: 31.07 KG/M2 | HEIGHT: 70 IN

## 2022-02-07 DIAGNOSIS — R07.2 PRECORDIAL PAIN: ICD-10-CM

## 2022-02-07 DIAGNOSIS — I34.0 NON-RHEUMATIC MITRAL REGURGITATION: ICD-10-CM

## 2022-02-07 PROCEDURE — 93325 DOPPLER ECHO COLOR FLOW MAPG: CPT | Mod: 26,,, | Performed by: INTERNAL MEDICINE

## 2022-02-07 PROCEDURE — 93320 STRESS ECHO (CUPID ONLY): ICD-10-PCS | Mod: 26,,, | Performed by: INTERNAL MEDICINE

## 2022-02-07 PROCEDURE — 93351 STRESS ECHO (CUPID ONLY): ICD-10-PCS | Mod: 26,,, | Performed by: INTERNAL MEDICINE

## 2022-02-07 PROCEDURE — 93320 DOPPLER ECHO COMPLETE: CPT | Mod: 26,,, | Performed by: INTERNAL MEDICINE

## 2022-02-07 PROCEDURE — 93351 STRESS TTE COMPLETE: CPT | Mod: PO

## 2022-02-07 PROCEDURE — 93325 STRESS ECHO (CUPID ONLY): ICD-10-PCS | Mod: 26,,, | Performed by: INTERNAL MEDICINE

## 2022-02-07 PROCEDURE — 93351 STRESS TTE COMPLETE: CPT | Mod: 26,,, | Performed by: INTERNAL MEDICINE

## 2022-02-08 LAB
ASCENDING AORTA: 3.13 CM
AV INDEX (PROSTH): 1
AV MEAN GRADIENT: 5 MMHG
AV PEAK GRADIENT: 8 MMHG
AV VALVE AREA: 4.12 CM2
AV VELOCITY RATIO: 0.94
BSA FOR ECHO PROCEDURE: 2.2 M2
CV ECHO LV RWT: 0.49 CM
CV STRESS BASE HR: 81 BPM
DIASTOLIC BLOOD PRESSURE: 77 MMHG
DOP CALC AO PEAK VEL: 1.37 M/S
DOP CALC AO VTI: 27.43 CM
DOP CALC LVOT AREA: 4.1 CM2
DOP CALC LVOT DIAMETER: 2.29 CM
DOP CALC LVOT PEAK VEL: 1.29 M/S
DOP CALC LVOT STROKE VOLUME: 113.12 CM3
DOP CALCLVOT PEAK VEL VTI: 27.48 CM
E WAVE DECELERATION TIME: 142.31 MSEC
E/A RATIO: 0.73
E/E' RATIO: 8.71 M/S
ECHO LV POSTERIOR WALL: 0.95 CM (ref 0.6–1.1)
EJECTION FRACTION: 55 %
FRACTIONAL SHORTENING: 27 % (ref 28–44)
INTERVENTRICULAR SEPTUM: 0.93 CM (ref 0.6–1.1)
LA MAJOR: 4.33 CM
LA MINOR: 4.12 CM
LA WIDTH: 3.99 CM
LEFT ATRIUM SIZE: 3.52 CM
LEFT ATRIUM VOLUME INDEX: 23.3 ML/M2
LEFT ATRIUM VOLUME: 50.41 CM3
LEFT INTERNAL DIMENSION IN SYSTOLE: 2.83 CM (ref 2.1–4)
LEFT VENTRICLE DIASTOLIC VOLUME INDEX: 30.44 ML/M2
LEFT VENTRICLE DIASTOLIC VOLUME: 65.74 ML
LEFT VENTRICLE MASS INDEX: 52 G/M2
LEFT VENTRICLE SYSTOLIC VOLUME INDEX: 14.1 ML/M2
LEFT VENTRICLE SYSTOLIC VOLUME: 30.45 ML
LEFT VENTRICULAR INTERNAL DIMENSION IN DIASTOLE: 3.9 CM (ref 3.5–6)
LEFT VENTRICULAR MASS: 111.91 G
LV LATERAL E/E' RATIO: 8.71 M/S
LV SEPTAL E/E' RATIO: 8.71 M/S
MV A" WAVE DURATION": 9.32 MSEC
MV PEAK A VEL: 0.83 M/S
MV PEAK E VEL: 0.61 M/S
MV STENOSIS PRESSURE HALF TIME: 41.27 MS
MV VALVE AREA P 1/2 METHOD: 5.33 CM2
OHS CV CPX 1 MINUTE RECOVERY HEART RATE: 110 BPM
OHS CV CPX 85 PERCENT MAX PREDICTED HEART RATE MALE: 143
OHS CV CPX ESTIMATED METS: 12
OHS CV CPX MAX PREDICTED HEART RATE: 168
OHS CV CPX PATIENT IS FEMALE: 0
OHS CV CPX PATIENT IS MALE: 1
OHS CV CPX PEAK DIASTOLIC BLOOD PRESSURE: 51 MMHG
OHS CV CPX PEAK HEAR RATE: 148 BPM
OHS CV CPX PEAK RATE PRESSURE PRODUCT: NORMAL
OHS CV CPX PEAK SYSTOLIC BLOOD PRESSURE: 178 MMHG
OHS CV CPX PERCENT MAX PREDICTED HEART RATE ACHIEVED: 88
OHS CV CPX RATE PRESSURE PRODUCT PRESENTING: 9639
PISA TR MAX VEL: 2.27 M/S
PULM VEIN S/D RATIO: 1.17
PV PEAK D VEL: 0.41 M/S
PV PEAK S VEL: 0.48 M/S
RA MAJOR: 4.05 CM
RA PRESSURE: 3 MMHG
RA WIDTH: 2.91 CM
RIGHT VENTRICULAR END-DIASTOLIC DIMENSION: 3.96 CM
RV TISSUE DOPPLER FREE WALL SYSTOLIC VELOCITY 1 (APICAL 4 CHAMBER VIEW): 14.89 CM/S
SINUS: 2.99 CM
STJ: 3.11 CM
STRESS ECHO POST EXERCISE DUR MIN: 7 MINUTES
STRESS ECHO POST EXERCISE DUR SEC: 13 SECONDS
SYSTOLIC BLOOD PRESSURE: 119 MMHG
TDI LATERAL: 0.07 M/S
TDI SEPTAL: 0.07 M/S
TDI: 0.07 M/S
TR MAX PG: 21 MMHG
TRICUSPID ANNULAR PLANE SYSTOLIC EXCURSION: 2.82 CM
TV REST PULMONARY ARTERY PRESSURE: 24 MMHG

## 2022-03-21 PROBLEM — Z00.00 ANNUAL PHYSICAL EXAM: Status: RESOLVED | Noted: 2021-12-14 | Resolved: 2022-03-21

## 2022-07-25 PROBLEM — Z00.00 ANNUAL PHYSICAL EXAM: Status: RESOLVED | Noted: 2021-12-14 | Resolved: 2022-07-25

## 2022-07-29 ENCOUNTER — OFFICE VISIT (OUTPATIENT)
Dept: CARDIOLOGY | Facility: CLINIC | Age: 52
End: 2022-07-29
Payer: COMMERCIAL

## 2022-07-29 VITALS
BODY MASS INDEX: 31.47 KG/M2 | WEIGHT: 219.81 LBS | HEART RATE: 69 BPM | SYSTOLIC BLOOD PRESSURE: 117 MMHG | HEIGHT: 70 IN | DIASTOLIC BLOOD PRESSURE: 73 MMHG

## 2022-07-29 DIAGNOSIS — E66.9 OBESITY, CLASS I, BMI 30-34.9: Chronic | ICD-10-CM

## 2022-07-29 DIAGNOSIS — R09.89 LEFT CAROTID BRUIT: ICD-10-CM

## 2022-07-29 DIAGNOSIS — F17.200 VAPING NICOTINE DEPENDENCE, NON-TOBACCO PRODUCT: Chronic | ICD-10-CM

## 2022-07-29 DIAGNOSIS — I10 ESSENTIAL HYPERTENSION: Chronic | ICD-10-CM

## 2022-07-29 DIAGNOSIS — I34.0 NON-RHEUMATIC MITRAL REGURGITATION: Primary | Chronic | ICD-10-CM

## 2022-07-29 DIAGNOSIS — R73.03 PRE-DIABETES: ICD-10-CM

## 2022-07-29 DIAGNOSIS — E78.5 DYSLIPIDEMIA (HIGH LDL; LOW HDL): Chronic | ICD-10-CM

## 2022-07-29 PROCEDURE — 3074F SYST BP LT 130 MM HG: CPT | Mod: CPTII,S$GLB,, | Performed by: INTERNAL MEDICINE

## 2022-07-29 PROCEDURE — 99999 PR PBB SHADOW E&M-EST. PATIENT-LVL IV: CPT | Mod: PBBFAC,,, | Performed by: INTERNAL MEDICINE

## 2022-07-29 PROCEDURE — 4010F ACE/ARB THERAPY RXD/TAKEN: CPT | Mod: CPTII,S$GLB,, | Performed by: INTERNAL MEDICINE

## 2022-07-29 PROCEDURE — 3074F PR MOST RECENT SYSTOLIC BLOOD PRESSURE < 130 MM HG: ICD-10-PCS | Mod: CPTII,S$GLB,, | Performed by: INTERNAL MEDICINE

## 2022-07-29 PROCEDURE — 1159F MED LIST DOCD IN RCRD: CPT | Mod: CPTII,S$GLB,, | Performed by: INTERNAL MEDICINE

## 2022-07-29 PROCEDURE — 1159F PR MEDICATION LIST DOCUMENTED IN MEDICAL RECORD: ICD-10-PCS | Mod: CPTII,S$GLB,, | Performed by: INTERNAL MEDICINE

## 2022-07-29 PROCEDURE — 3078F PR MOST RECENT DIASTOLIC BLOOD PRESSURE < 80 MM HG: ICD-10-PCS | Mod: CPTII,S$GLB,, | Performed by: INTERNAL MEDICINE

## 2022-07-29 PROCEDURE — 99999 PR PBB SHADOW E&M-EST. PATIENT-LVL IV: ICD-10-PCS | Mod: PBBFAC,,, | Performed by: INTERNAL MEDICINE

## 2022-07-29 PROCEDURE — 3008F PR BODY MASS INDEX (BMI) DOCUMENTED: ICD-10-PCS | Mod: CPTII,S$GLB,, | Performed by: INTERNAL MEDICINE

## 2022-07-29 PROCEDURE — 3078F DIAST BP <80 MM HG: CPT | Mod: CPTII,S$GLB,, | Performed by: INTERNAL MEDICINE

## 2022-07-29 PROCEDURE — 99214 OFFICE O/P EST MOD 30 MIN: CPT | Mod: S$GLB,,, | Performed by: INTERNAL MEDICINE

## 2022-07-29 PROCEDURE — 99214 PR OFFICE/OUTPT VISIT, EST, LEVL IV, 30-39 MIN: ICD-10-PCS | Mod: S$GLB,,, | Performed by: INTERNAL MEDICINE

## 2022-07-29 PROCEDURE — 3008F BODY MASS INDEX DOCD: CPT | Mod: CPTII,S$GLB,, | Performed by: INTERNAL MEDICINE

## 2022-07-29 PROCEDURE — 4010F PR ACE/ARB THEARPY RXD/TAKEN: ICD-10-PCS | Mod: CPTII,S$GLB,, | Performed by: INTERNAL MEDICINE

## 2022-07-29 RX ORDER — ROSUVASTATIN CALCIUM 20 MG/1
20 TABLET, COATED ORAL NIGHTLY
Qty: 90 TABLET | Refills: 1 | Status: SHIPPED | OUTPATIENT
Start: 2022-07-29 | End: 2022-11-16 | Stop reason: SDUPTHER

## 2022-07-29 RX ORDER — OLMESARTAN MEDOXOMIL 20 MG/1
20 TABLET ORAL DAILY
Qty: 90 TABLET | Refills: 1 | Status: SHIPPED | OUTPATIENT
Start: 2022-07-29 | End: 2022-11-16 | Stop reason: SDUPTHER

## 2022-07-29 NOTE — PROGRESS NOTES
Subjective:    Patient ID:  William Winslow is a 51 y.o. male who presents for Hypertension, Hyperlipidemia, and Risk Factor Management For Atherosclerosis        HPI  DISCUSSED LABS AND GOALS, , LDL 65, HDL 37, STOPPED SMOKING BUT VAPING, NO SIGNIFICANT SHORTNESS OF BREATH NO TIA TYPE SYMPTOMS NO NEAR-SYNCOPE, SEE ROS    Past Medical History:   Diagnosis Date    Cervical herniated disc     Insomnia     Psoriasis     Sleep apnea     Type 2 diabetes mellitus without complication 10/6/2017     Past Surgical History:   Procedure Laterality Date    MOUTH SURGERY      NASAL SEPTUM SURGERY       Family History   Problem Relation Age of Onset    Heart disease Father      Social History     Socioeconomic History    Marital status:    Tobacco Use    Smoking status: Former Smoker     Packs/day: 1.00     Types: Cigarettes     Quit date: 2022     Years since quittin.2    Smokeless tobacco: Never Used    Tobacco comment: Currenlty vaping    Substance and Sexual Activity    Alcohol use: No    Drug use: No       Review of patient's allergies indicates:  No Known Allergies    Current Outpatient Medications:     ascorbic acid, vitamin C, (VITAMIN C) 1000 MG tablet, Take 1,000 mg by mouth once daily., Disp: , Rfl:     aspirin 81 MG Chew, Take 81 mg by mouth once daily., Disp: , Rfl:     cefdinir (OMNICEF) 300 MG capsule, Take 300 mg by mouth every 12 (twelve) hours., Disp: , Rfl:     CHLO HIST 1-12.5 mg/5 mL Soln, Take 10 mLs by mouth 3 (three) times daily as needed., Disp: , Rfl:     clobetasol 0.05% (TEMOVATE) 0.05 % Oint, APPLY TO THE SKIN TWICE DAILY. APPLY TO ELBOWS, Disp: , Rfl:     COQ10, LIPOSOMAL UBIQUINOL, ORAL, Take 1 capsule by mouth once daily., Disp: , Rfl:     EScitalopram oxalate (LEXAPRO) 20 MG tablet, Take 1 tablet (20 mg total) by mouth once daily., Disp: 90 tablet, Rfl: 3    lidocaine (LIDODERM) 5 %, Place 1 patch onto the skin once daily. Remove & Discard patch within  12 hours or as directed by MD, Disp: 12 patch, Rfl: 0    LYRICA 25 mg capsule, Take 25 mg by mouth every evening. PRN, Disp: , Rfl: 0    metFORMIN (GLUCOPHAGE) 1000 MG tablet, Take 1 tablet (1,000 mg total) by mouth daily with breakfast., Disp: 90 tablet, Rfl: 3    OMEGA-3-DHA-EPA-DPA ORAL, Take by mouth 2 (two) times a day., Disp: , Rfl:     OTEZLA 30 mg Tab, Take by mouth 2 (two) times daily. , Disp: , Rfl:     pantoprazole (PROTONIX) 40 MG tablet, Take 1 tablet (40 mg total) by mouth once daily., Disp: 90 tablet, Rfl: 3    vitamin D 1000 units Tab, Take 185 mg by mouth once daily., Disp: , Rfl:     hydrocodone-chlorpheniramine (TUSSIONEX PENNKINETIC ER) 10-8 mg/5 mL suspension, Take 5 mLs by mouth nightly as needed for Cough. (Patient not taking: No sig reported), Disp: 35 mL, Rfl: 0    olmesartan (BENICAR) 20 MG tablet, Take 1 tablet (20 mg total) by mouth once daily., Disp: 90 tablet, Rfl: 1    rosuvastatin (CRESTOR) 20 MG tablet, Take 1 tablet (20 mg total) by mouth every evening., Disp: 90 tablet, Rfl: 1    Review of Systems   Constitutional: Negative for chills, diaphoresis, fever, malaise/fatigue and night sweats.   HENT: Negative for congestion and nosebleeds.    Eyes: Negative.    Cardiovascular: Negative for chest pain (RARE WITH STRESS), claudication, cyanosis, dyspnea on exertion, irregular heartbeat, leg swelling, near-syncope, orthopnea, palpitations, paroxysmal nocturnal dyspnea and syncope.   Respiratory: Negative for cough, hemoptysis, shortness of breath and wheezing.    Hematologic/Lymphatic: Negative for adenopathy. Does not bruise/bleed easily.   Skin: Negative for color change and rash.        PSORIASIS   Musculoskeletal: Negative for arthritis, back pain and falls.   Gastrointestinal: Negative for abdominal pain, change in bowel habit, dysphagia, jaundice, melena and nausea.   Genitourinary: Negative for dysuria and flank pain.   Neurological: Negative for brief paralysis, focal  "weakness, headaches, light-headedness and weakness.   Psychiatric/Behavioral: Negative for altered mental status, depression and memory loss.   Allergic/Immunologic: Negative for environmental allergies and persistent infections.        Objective:      Vitals:    07/29/22 0951   BP: 117/73   Pulse: 69   Weight: 99.7 kg (219 lb 12.8 oz)   Height: 5' 10" (1.778 m)   PainSc: 0-No pain     Body mass index is 31.54 kg/m².    Physical Exam  Constitutional:       Appearance: He is well-developed. He is obese.   HENT:      Head: Normocephalic and atraumatic.   Eyes:      General: No scleral icterus.     Extraocular Movements: Extraocular movements intact.   Neck:      Vascular: Normal carotid pulses. Carotid bruit present. No hepatojugular reflux or JVD.   Cardiovascular:      Rate and Rhythm: Normal rate and regular rhythm.  No extrasystoles are present.     Pulses:           Carotid pulses are 2+ on the right side and 2+ on the left side with bruit.       Radial pulses are 2+ on the right side and 2+ on the left side.        Femoral pulses are 2+ on the right side and 2+ on the left side.       Posterior tibial pulses are 2+ on the right side and 2+ on the left side.      Heart sounds: Murmur heard.    Systolic murmur is present with a grade of 1/6 at the lower left sternal border.    No friction rub. No gallop. No S4 sounds.   Pulmonary:      Effort: Pulmonary effort is normal.      Breath sounds: Normal breath sounds. No decreased breath sounds, rhonchi or rales.   Abdominal:      Palpations: Abdomen is soft.      Tenderness: There is no abdominal tenderness.   Musculoskeletal:         General: Normal range of motion.      Cervical back: Neck supple.      Right lower leg: No edema.      Left lower leg: No edema.   Skin:     General: Skin is warm and dry.      Findings: No rash.   Neurological:      Mental Status: He is alert and oriented to person, place, and time.      Cranial Nerves: No cranial nerve deficit.      " Motor: No tremor.   Psychiatric:         Mood and Affect: Mood normal.         Speech: Speech normal.         Behavior: Behavior normal.                 ..    Chemistry        Component Value Date/Time     07/25/2022 0715    K 4.5 07/25/2022 0715     07/25/2022 0715    CO2 25 07/25/2022 0715    BUN 14 07/25/2022 0715    CREATININE 0.79 07/25/2022 0715     (H) 07/25/2022 0715        Component Value Date/Time    CALCIUM 9.0 07/25/2022 0715    ALKPHOS 65 07/25/2022 0715    AST 32 07/25/2022 0715    AST 33 12/02/2015 0716    ALT 39 07/25/2022 0715    BILITOT 0.7 07/25/2022 0715    ESTGFRAFRICA >60 07/25/2022 0715    EGFRNONAA >60 07/25/2022 0715            ..  Lab Results   Component Value Date    CHOL 131 07/25/2022    CHOL 147 12/10/2021    CHOL 147 12/10/2021     Lab Results   Component Value Date    HDL 37 (L) 07/25/2022    HDL 29 (L) 12/10/2021    HDL 29 (L) 12/10/2021     Lab Results   Component Value Date    LDLCALC 65.4 07/25/2022    LDLCALC 89.0 12/10/2021    LDLCALC 89.0 12/10/2021     Lab Results   Component Value Date    TRIG 143 07/25/2022    TRIG 145 12/10/2021    TRIG 145 12/10/2021     Lab Results   Component Value Date    CHOLHDL 28.2 07/25/2022    CHOLHDL 19.7 (L) 12/10/2021    CHOLHDL 19.7 (L) 12/10/2021     ..  Lab Results   Component Value Date    WBC 8.26 03/25/2021    HGB 15.4 03/25/2021    HCT 47.3 03/25/2021    MCV 93 03/25/2021     03/25/2021       Test(s) Reviewed  I have reviewed the following in detail:  [] Stress test   [] Angiography   [] Echocardiogram   [x] Labs   [] Other:       Assessment:         ICD-10-CM ICD-9-CM   1. Non-rheumatic mitral regurgitation  I34.0 424.0   2. Left carotid bruit  R09.89 785.9   3. Dyslipidemia (high LDL; low HDL)  E78.5 272.4   4. Essential hypertension  I10 401.9   5. Vaping nicotine dependence, non-tobacco product  F17.200 305.1   6. Obesity, Class I, BMI 30-34.9  E66.9 278.00   7. Pre-diabetes  R73.03 790.29     Problem List  Items Addressed This Visit        Cardiac/Vascular    Essential hypertension    Relevant Medications    olmesartan (BENICAR) 20 MG tablet    Other Relevant Orders    Comprehensive Metabolic Panel    Non-rheumatic mitral regurgitation - Primary    Dyslipidemia (high LDL; low HDL)    Relevant Orders    Comprehensive Metabolic Panel    Left carotid bruit    Relevant Orders    CV Ultrasound Bilateral Doppler Carotid       Endocrine    Obesity, Class I, BMI 30-34.9    Pre-diabetes    Relevant Orders    Hemoglobin A1C       Other    Vaping nicotine dependence, non-tobacco product           Plan:     CAROTID ULTRASOUND,ALL CV CLINICALLY STABLE, NO ANGINA, NO HF, NO TIA, NO CLINICAL ARRHYTHMIA,CONTINUE CURRENT MEDS, EDUCATION, DIET, EXERCISE,, WEIGHT LOSS, TOBACCO CESSATION AND VAPING COUNSELING, RETURN TO CLINIC IN 6 MO WITH LABS      Non-rheumatic mitral regurgitation    Left carotid bruit  Comments:  CAROTID ULTRASOUND  Orders:  -     CV Ultrasound Bilateral Doppler Carotid; Future    Dyslipidemia (high LDL; low HDL)  -     Comprehensive Metabolic Panel; Future; Expected date: 07/29/2022    Essential hypertension  -     Comprehensive Metabolic Panel; Future; Expected date: 07/29/2022  -     olmesartan (BENICAR) 20 MG tablet; Take 1 tablet (20 mg total) by mouth once daily.  Dispense: 90 tablet; Refill: 1    Vaping nicotine dependence, non-tobacco product  Comments:  COUNSELING    Obesity, Class I, BMI 30-34.9    Pre-diabetes  -     Hemoglobin A1C; Future; Expected date: 01/29/2023    Other orders  -     rosuvastatin (CRESTOR) 20 MG tablet; Take 1 tablet (20 mg total) by mouth every evening.  Dispense: 90 tablet; Refill: 1    RTC Low level/low impact aerobic exercise 5x's/wk. Heart healthy diet and risk factor modification.    See labs and med orders.    Aerobic exercise 5x's/wk. Heart healthy diet and risk factor modification.    See labs and med orders.

## 2022-08-26 ENCOUNTER — CLINICAL SUPPORT (OUTPATIENT)
Dept: CARDIOLOGY | Facility: HOSPITAL | Age: 52
End: 2022-08-26
Attending: INTERNAL MEDICINE
Payer: COMMERCIAL

## 2022-08-26 DIAGNOSIS — R09.89 LEFT CAROTID BRUIT: ICD-10-CM

## 2022-08-26 LAB
LEFT ARM DIASTOLIC BLOOD PRESSURE: 73 MMHG
LEFT ARM SYSTOLIC BLOOD PRESSURE: 117 MMHG
LEFT CBA DIAS: 20 CM/S
LEFT CBA SYS: 81 CM/S
LEFT CCA DIST DIAS: 28 CM/S
LEFT CCA DIST SYS: 108 CM/S
LEFT CCA MID DIAS: 24 CM/S
LEFT CCA MID SYS: 92 CM/S
LEFT CCA PROX DIAS: 24 CM/S
LEFT CCA PROX SYS: 113 CM/S
LEFT ECA DIAS: 23 CM/S
LEFT ECA SYS: 106 CM/S
LEFT ICA DIST DIAS: 24 CM/S
LEFT ICA DIST SYS: 62 CM/S
LEFT ICA MID DIAS: 23 CM/S
LEFT ICA MID SYS: 68 CM/S
LEFT ICA PROX DIAS: 20 CM/S
LEFT ICA PROX SYS: 81 CM/S
LEFT VERTEBRAL DIAS: 11 CM/S
LEFT VERTEBRAL SYS: 48 CM/S
OHS CV CAROTID RIGHT ICA EDV HIGHEST: 29
OHS CV CAROTID ULTRASOUND LEFT ICA/CCA RATIO: 0.75
OHS CV CAROTID ULTRASOUND RIGHT ICA/CCA RATIO: 0.79
OHS CV PV CAROTID LEFT HIGHEST CCA: 113
OHS CV PV CAROTID LEFT HIGHEST ICA: 81
OHS CV PV CAROTID RIGHT HIGHEST CCA: 111
OHS CV PV CAROTID RIGHT HIGHEST ICA: 79
OHS CV US CAROTID LEFT HIGHEST EDV: 24
RIGHT ARM DIASTOLIC BLOOD PRESSURE: 73 MMHG
RIGHT ARM SYSTOLIC BLOOD PRESSURE: 117 MMHG
RIGHT CBA DIAS: 18 CM/S
RIGHT CBA SYS: 62 CM/S
RIGHT CCA DIST DIAS: 30 CM/S
RIGHT CCA DIST SYS: 100 CM/S
RIGHT CCA MID DIAS: 19 CM/S
RIGHT CCA MID SYS: 84 CM/S
RIGHT CCA PROX DIAS: 17 CM/S
RIGHT CCA PROX SYS: 111 CM/S
RIGHT ECA DIAS: 29 CM/S
RIGHT ECA SYS: 115 CM/S
RIGHT ICA DIST DIAS: 29 CM/S
RIGHT ICA DIST SYS: 76 CM/S
RIGHT ICA MID DIAS: 28 CM/S
RIGHT ICA MID SYS: 79 CM/S
RIGHT ICA PROX DIAS: 26 CM/S
RIGHT ICA PROX SYS: 56 CM/S
RIGHT VERTEBRAL DIAS: 14 CM/S
RIGHT VERTEBRAL SYS: 47 CM/S

## 2022-08-26 PROCEDURE — 93880 EXTRACRANIAL BILAT STUDY: CPT | Mod: PO

## 2022-08-26 PROCEDURE — 93880 EXTRACRANIAL BILAT STUDY: CPT | Mod: 26,,, | Performed by: INTERNAL MEDICINE

## 2022-08-26 PROCEDURE — 93880 CV US DOPPLER CAROTID (CUPID ONLY): ICD-10-PCS | Mod: 26,,, | Performed by: INTERNAL MEDICINE

## 2022-12-19 PROBLEM — Z00.00 ANNUAL PHYSICAL EXAM: Status: ACTIVE | Noted: 2022-12-19

## 2023-01-27 ENCOUNTER — OFFICE VISIT (OUTPATIENT)
Dept: CARDIOLOGY | Facility: CLINIC | Age: 53
End: 2023-01-27
Payer: COMMERCIAL

## 2023-01-27 VITALS
WEIGHT: 226.19 LBS | SYSTOLIC BLOOD PRESSURE: 110 MMHG | HEART RATE: 70 BPM | HEIGHT: 70 IN | BODY MASS INDEX: 32.38 KG/M2 | DIASTOLIC BLOOD PRESSURE: 72 MMHG

## 2023-01-27 DIAGNOSIS — I34.0 NON-RHEUMATIC MITRAL REGURGITATION: Primary | Chronic | ICD-10-CM

## 2023-01-27 DIAGNOSIS — I10 ESSENTIAL HYPERTENSION: Chronic | ICD-10-CM

## 2023-01-27 DIAGNOSIS — R93.1 AGATSTON CAC SCORE, <100: Chronic | ICD-10-CM

## 2023-01-27 DIAGNOSIS — E78.5 DYSLIPIDEMIA (HIGH LDL; LOW HDL): Chronic | ICD-10-CM

## 2023-01-27 DIAGNOSIS — F17.200 VAPING NICOTINE DEPENDENCE, NON-TOBACCO PRODUCT: Chronic | ICD-10-CM

## 2023-01-27 DIAGNOSIS — E66.9 OBESITY, CLASS I, BMI 30-34.9: Chronic | ICD-10-CM

## 2023-01-27 DIAGNOSIS — R73.03 PRE-DIABETES: ICD-10-CM

## 2023-01-27 PROBLEM — R09.89 LEFT CAROTID BRUIT: Status: RESOLVED | Noted: 2022-07-29 | Resolved: 2023-01-27

## 2023-01-27 PROCEDURE — 3044F HG A1C LEVEL LT 7.0%: CPT | Mod: CPTII,S$GLB,, | Performed by: INTERNAL MEDICINE

## 2023-01-27 PROCEDURE — 3008F PR BODY MASS INDEX (BMI) DOCUMENTED: ICD-10-PCS | Mod: CPTII,S$GLB,, | Performed by: INTERNAL MEDICINE

## 2023-01-27 PROCEDURE — 3078F DIAST BP <80 MM HG: CPT | Mod: CPTII,S$GLB,, | Performed by: INTERNAL MEDICINE

## 2023-01-27 PROCEDURE — 99999 PR PBB SHADOW E&M-EST. PATIENT-LVL IV: CPT | Mod: PBBFAC,,, | Performed by: INTERNAL MEDICINE

## 2023-01-27 PROCEDURE — 99999 PR PBB SHADOW E&M-EST. PATIENT-LVL IV: ICD-10-PCS | Mod: PBBFAC,,, | Performed by: INTERNAL MEDICINE

## 2023-01-27 PROCEDURE — 3074F SYST BP LT 130 MM HG: CPT | Mod: CPTII,S$GLB,, | Performed by: INTERNAL MEDICINE

## 2023-01-27 PROCEDURE — 1159F PR MEDICATION LIST DOCUMENTED IN MEDICAL RECORD: ICD-10-PCS | Mod: CPTII,S$GLB,, | Performed by: INTERNAL MEDICINE

## 2023-01-27 PROCEDURE — 99214 OFFICE O/P EST MOD 30 MIN: CPT | Mod: S$GLB,,, | Performed by: INTERNAL MEDICINE

## 2023-01-27 PROCEDURE — 3044F PR MOST RECENT HEMOGLOBIN A1C LEVEL <7.0%: ICD-10-PCS | Mod: CPTII,S$GLB,, | Performed by: INTERNAL MEDICINE

## 2023-01-27 PROCEDURE — 1159F MED LIST DOCD IN RCRD: CPT | Mod: CPTII,S$GLB,, | Performed by: INTERNAL MEDICINE

## 2023-01-27 PROCEDURE — 99214 PR OFFICE/OUTPT VISIT, EST, LEVL IV, 30-39 MIN: ICD-10-PCS | Mod: S$GLB,,, | Performed by: INTERNAL MEDICINE

## 2023-01-27 PROCEDURE — 3078F PR MOST RECENT DIASTOLIC BLOOD PRESSURE < 80 MM HG: ICD-10-PCS | Mod: CPTII,S$GLB,, | Performed by: INTERNAL MEDICINE

## 2023-01-27 PROCEDURE — 3074F PR MOST RECENT SYSTOLIC BLOOD PRESSURE < 130 MM HG: ICD-10-PCS | Mod: CPTII,S$GLB,, | Performed by: INTERNAL MEDICINE

## 2023-01-27 PROCEDURE — 3008F BODY MASS INDEX DOCD: CPT | Mod: CPTII,S$GLB,, | Performed by: INTERNAL MEDICINE

## 2023-01-27 NOTE — PROGRESS NOTES
Subjective:    Patient ID:  William Winslow is a 52 y.o. male who presents for Non-rheumatic mitral regurgitation        HPI    DISCUSSED LABS HBA1C 5.9%, RECENT  UP FROM 65, VAPING, NORMAL CAROTIDS, SEE ROS  Past Medical History:   Diagnosis Date    Cervical herniated disc     Insomnia     Psoriasis     Sleep apnea     Type 2 diabetes mellitus without complication 10/6/2017     Past Surgical History:   Procedure Laterality Date    MOUTH SURGERY      NASAL SEPTUM SURGERY       Family History   Problem Relation Age of Onset    Heart disease Father      Social History     Socioeconomic History    Marital status:    Tobacco Use    Smoking status: Former     Packs/day: 1.00     Types: Cigarettes     Quit date: 2022     Years since quittin.7    Smokeless tobacco: Never    Tobacco comments:     Currenlty vaping    Substance and Sexual Activity    Alcohol use: No    Drug use: No       Review of patient's allergies indicates:  No Known Allergies    Current Outpatient Medications:     ascorbic acid, vitamin C, (VITAMIN C) 1000 MG tablet, Take 1,000 mg by mouth once daily., Disp: , Rfl:     aspirin 81 MG Chew, Take 81 mg by mouth once daily., Disp: , Rfl:     clobetasol 0.05% (TEMOVATE) 0.05 % Oint, APPLY TO THE SKIN TWICE DAILY. APPLY TO ELBOWS, Disp: , Rfl:     COQ10, LIPOSOMAL UBIQUINOL, ORAL, Take 1 capsule by mouth once daily., Disp: , Rfl:     DUOBRII 0.01-0.045 % Lotn, Apply topically., Disp: , Rfl:     EScitalopram oxalate (LEXAPRO) 20 MG tablet, Take 1 tablet (20 mg total) by mouth once daily., Disp: 90 tablet, Rfl: 4    metFORMIN (GLUCOPHAGE) 1000 MG tablet, Take 1 tablet (1,000 mg total) by mouth daily with breakfast., Disp: 90 tablet, Rfl: 4    olmesartan (BENICAR) 20 MG tablet, Take 1 tablet (20 mg total) by mouth once daily., Disp: 90 tablet, Rfl: 4    OMEGA-3-DHA-EPA-DPA ORAL, Take by mouth 2 (two) times a day., Disp: , Rfl:     OTEZLA 30 mg Tab, Take by mouth 2 (two) times daily. ,  "Disp: , Rfl:     pantoprazole (PROTONIX) 40 MG tablet, Take 1 tablet (40 mg total) by mouth once daily., Disp: 90 tablet, Rfl: 4    rosuvastatin (CRESTOR) 20 MG tablet, Take 1 tablet (20 mg total) by mouth every evening., Disp: 90 tablet, Rfl: 4    vitamin D 1000 units Tab, Take 185 mg by mouth once daily., Disp: , Rfl:     Review of Systems   Constitutional: Negative for chills, diaphoresis, fever, malaise/fatigue and night sweats.   HENT:  Negative for congestion and nosebleeds.    Eyes: Negative.  Negative for blurred vision and visual disturbance.   Cardiovascular:  Negative for chest pain, claudication, cyanosis, dyspnea on exertion, irregular heartbeat, leg swelling, near-syncope, orthopnea, palpitations, paroxysmal nocturnal dyspnea and syncope.   Respiratory:  Negative for cough, hemoptysis, shortness of breath and wheezing.    Hematologic/Lymphatic: Negative for adenopathy. Does not bruise/bleed easily.   Skin:  Negative for color change and rash.        PSORIASIS   Musculoskeletal:  Negative for back pain and falls.   Gastrointestinal:  Negative for abdominal pain, change in bowel habit, dysphagia, jaundice, melena and nausea.   Genitourinary:  Negative for dysuria and flank pain.   Neurological:  Negative for brief paralysis, focal weakness, headaches, light-headedness, loss of balance, numbness and weakness.   Psychiatric/Behavioral:  Negative for altered mental status, depression and memory loss.       Objective:      Vitals:    01/27/23 0926   BP: 110/72   Pulse: 70   Weight: 102.6 kg (226 lb 3.1 oz)   Height: 5' 10" (1.778 m)   PainSc: 0-No pain     Body mass index is 32.46 kg/m².    Physical Exam  Constitutional:       Appearance: He is well-developed. He is obese.   HENT:      Head: Normocephalic and atraumatic.   Eyes:      Extraocular Movements: Extraocular movements intact.      Conjunctiva/sclera: Conjunctivae normal.   Neck:      Vascular: Normal carotid pulses. No hepatojugular reflux or JVD. "   Cardiovascular:      Rate and Rhythm: Normal rate and regular rhythm. No extrasystoles are present.     Pulses:           Carotid pulses are 2+ on the right side and 2+ on the left side.       Radial pulses are 2+ on the right side and 2+ on the left side.        Femoral pulses are 2+ on the right side and 2+ on the left side.       Posterior tibial pulses are 2+ on the right side and 2+ on the left side.      Heart sounds: Murmur heard.   Systolic murmur is present with a grade of 1/6 at the lower left sternal border.     No friction rub. No gallop. No S4 sounds.   Pulmonary:      Effort: Pulmonary effort is normal.      Breath sounds: Normal breath sounds. No decreased breath sounds, rhonchi or rales.   Abdominal:      Palpations: Abdomen is soft.      Tenderness: There is no abdominal tenderness.   Musculoskeletal:         General: Normal range of motion.      Cervical back: Neck supple.      Right lower leg: No edema.      Left lower leg: No edema.   Skin:     General: Skin is warm and dry.      Capillary Refill: Capillary refill takes less than 2 seconds.   Neurological:      General: No focal deficit present.      Mental Status: He is alert and oriented to person, place, and time.      Cranial Nerves: No cranial nerve deficit.      Motor: No tremor.   Psychiatric:         Mood and Affect: Mood normal.         Speech: Speech normal.         Behavior: Behavior normal.             ..    Chemistry        Component Value Date/Time     11/29/2022 0708    K 4.5 11/29/2022 0708     11/29/2022 0708    CO2 26 11/29/2022 0708    BUN 18 11/29/2022 0708    CREATININE 0.90 11/29/2022 0708     (H) 11/29/2022 0708        Component Value Date/Time    CALCIUM 9.3 11/29/2022 0708    ALKPHOS 68 11/29/2022 0708    AST 39 11/29/2022 0708    AST 33 12/02/2015 0716    ALT 69 (H) 11/29/2022 0708    BILITOT 0.4 11/29/2022 0708    ESTGFRAFRICA >60 07/25/2022 0715    EGFRNONAA >60 07/25/2022 0715            ..  Lab  Results   Component Value Date    CHOL 185 11/29/2022    CHOL 131 07/25/2022    CHOL 147 12/10/2021    CHOL 147 12/10/2021     Lab Results   Component Value Date    HDL 42 11/29/2022    HDL 37 (L) 07/25/2022    HDL 29 (L) 12/10/2021    HDL 29 (L) 12/10/2021     Lab Results   Component Value Date    LDLCALC 105.4 11/29/2022    LDLCALC 65.4 07/25/2022    LDLCALC 89.0 12/10/2021    LDLCALC 89.0 12/10/2021     Lab Results   Component Value Date    TRIG 188 (H) 11/29/2022    TRIG 143 07/25/2022    TRIG 145 12/10/2021    TRIG 145 12/10/2021     Lab Results   Component Value Date    CHOLHDL 22.7 11/29/2022    CHOLHDL 28.2 07/25/2022    CHOLHDL 19.7 (L) 12/10/2021    CHOLHDL 19.7 (L) 12/10/2021     ..  Lab Results   Component Value Date    WBC 8.26 03/25/2021    HGB 15.4 03/25/2021    HCT 47.3 03/25/2021    MCV 93 03/25/2021     03/25/2021       Test(s) Reviewed  I have reviewed the following in detail:  [] Stress test   [] Angiography   [] Echocardiogram   [] Labs   [] Other:       Assessment:         ICD-10-CM ICD-9-CM   1. Non-rheumatic mitral regurgitation  I34.0 424.0   2. Dyslipidemia (high LDL; low HDL)  E78.5 272.4   3. Agatston CAC score, <100  R93.1 793.2   4. Essential hypertension  I10 401.9   5. Pre-diabetes  R73.03 790.29   6. Obesity, Class I, BMI 30-34.9  E66.9 278.00     Problem List Items Addressed This Visit          Cardiac/Vascular    Essential hypertension    Agatston CAC score, <100    Non-rheumatic mitral regurgitation - Primary    Dyslipidemia (high LDL; low HDL)       Endocrine    Obesity, Class I, BMI 30-34.9    Pre-diabetes        Plan:         DIET, STOP VAPING, ALL CV CLINICALLY STABLE, NO ANGINA, NO HF, NO TIA, NO CLINICAL ARRHYTHMIA,CONTINUE CURRENT MEDS, EDUCATION, DIET, EXERCISE , WEIGHT LOSS, RTC IN 6 MO  WITH LABS  Non-rheumatic mitral regurgitation    Dyslipidemia (high LDL; low HDL)    Agatston CAC score, <100    Essential hypertension    Pre-diabetes    Obesity, Class I, BMI  30-34.9    RTC Low level/low impact aerobic exercise 5x's/wk. Heart healthy diet and risk factor modification.    See labs and med orders.    Aerobic exercise 5x's/wk. Heart healthy diet and risk factor modification.    See labs and med orders.

## 2023-03-20 PROBLEM — Z00.00 ANNUAL PHYSICAL EXAM: Status: RESOLVED | Noted: 2022-12-19 | Resolved: 2023-03-20

## 2023-07-28 ENCOUNTER — OFFICE VISIT (OUTPATIENT)
Dept: CARDIOLOGY | Facility: CLINIC | Age: 53
End: 2023-07-28
Payer: COMMERCIAL

## 2023-07-28 VITALS
DIASTOLIC BLOOD PRESSURE: 77 MMHG | WEIGHT: 239.19 LBS | HEIGHT: 70 IN | BODY MASS INDEX: 34.24 KG/M2 | HEART RATE: 73 BPM | SYSTOLIC BLOOD PRESSURE: 125 MMHG

## 2023-07-28 DIAGNOSIS — E11.9 TYPE 2 DIABETES MELLITUS WITHOUT COMPLICATION, WITHOUT LONG-TERM CURRENT USE OF INSULIN: Chronic | ICD-10-CM

## 2023-07-28 DIAGNOSIS — I34.0 NON-RHEUMATIC MITRAL REGURGITATION: Primary | Chronic | ICD-10-CM

## 2023-07-28 DIAGNOSIS — E66.9 OBESITY, CLASS I, BMI 30-34.9: Chronic | ICD-10-CM

## 2023-07-28 DIAGNOSIS — F17.200 VAPING NICOTINE DEPENDENCE, NON-TOBACCO PRODUCT: ICD-10-CM

## 2023-07-28 DIAGNOSIS — E78.5 DYSLIPIDEMIA (HIGH LDL; LOW HDL): ICD-10-CM

## 2023-07-28 DIAGNOSIS — I10 ESSENTIAL HYPERTENSION: Chronic | ICD-10-CM

## 2023-07-28 PROBLEM — Z72.0 TOBACCO USE: Status: RESOLVED | Noted: 2017-10-06 | Resolved: 2023-07-28

## 2023-07-28 PROCEDURE — 4010F ACE/ARB THERAPY RXD/TAKEN: CPT | Mod: CPTII,S$GLB,, | Performed by: INTERNAL MEDICINE

## 2023-07-28 PROCEDURE — 99214 OFFICE O/P EST MOD 30 MIN: CPT | Mod: S$GLB,,, | Performed by: INTERNAL MEDICINE

## 2023-07-28 PROCEDURE — 99999 PR PBB SHADOW E&M-EST. PATIENT-LVL III: ICD-10-PCS | Mod: PBBFAC,,, | Performed by: INTERNAL MEDICINE

## 2023-07-28 PROCEDURE — 99214 PR OFFICE/OUTPT VISIT, EST, LEVL IV, 30-39 MIN: ICD-10-PCS | Mod: S$GLB,,, | Performed by: INTERNAL MEDICINE

## 2023-07-28 PROCEDURE — 3044F PR MOST RECENT HEMOGLOBIN A1C LEVEL <7.0%: ICD-10-PCS | Mod: CPTII,S$GLB,, | Performed by: INTERNAL MEDICINE

## 2023-07-28 PROCEDURE — 3008F PR BODY MASS INDEX (BMI) DOCUMENTED: ICD-10-PCS | Mod: CPTII,S$GLB,, | Performed by: INTERNAL MEDICINE

## 2023-07-28 PROCEDURE — 3074F SYST BP LT 130 MM HG: CPT | Mod: CPTII,S$GLB,, | Performed by: INTERNAL MEDICINE

## 2023-07-28 PROCEDURE — 3078F PR MOST RECENT DIASTOLIC BLOOD PRESSURE < 80 MM HG: ICD-10-PCS | Mod: CPTII,S$GLB,, | Performed by: INTERNAL MEDICINE

## 2023-07-28 PROCEDURE — 4010F PR ACE/ARB THEARPY RXD/TAKEN: ICD-10-PCS | Mod: CPTII,S$GLB,, | Performed by: INTERNAL MEDICINE

## 2023-07-28 PROCEDURE — 99999 PR PBB SHADOW E&M-EST. PATIENT-LVL III: CPT | Mod: PBBFAC,,, | Performed by: INTERNAL MEDICINE

## 2023-07-28 PROCEDURE — 1159F MED LIST DOCD IN RCRD: CPT | Mod: CPTII,S$GLB,, | Performed by: INTERNAL MEDICINE

## 2023-07-28 PROCEDURE — 3078F DIAST BP <80 MM HG: CPT | Mod: CPTII,S$GLB,, | Performed by: INTERNAL MEDICINE

## 2023-07-28 PROCEDURE — 3008F BODY MASS INDEX DOCD: CPT | Mod: CPTII,S$GLB,, | Performed by: INTERNAL MEDICINE

## 2023-07-28 PROCEDURE — 3074F PR MOST RECENT SYSTOLIC BLOOD PRESSURE < 130 MM HG: ICD-10-PCS | Mod: CPTII,S$GLB,, | Performed by: INTERNAL MEDICINE

## 2023-07-28 PROCEDURE — 3044F HG A1C LEVEL LT 7.0%: CPT | Mod: CPTII,S$GLB,, | Performed by: INTERNAL MEDICINE

## 2023-07-28 PROCEDURE — 1159F PR MEDICATION LIST DOCUMENTED IN MEDICAL RECORD: ICD-10-PCS | Mod: CPTII,S$GLB,, | Performed by: INTERNAL MEDICINE

## 2023-07-28 RX ORDER — ROSUVASTATIN CALCIUM 20 MG/1
20 TABLET, COATED ORAL NIGHTLY
Qty: 90 TABLET | Refills: 1 | Status: SHIPPED | OUTPATIENT
Start: 2023-07-28 | End: 2023-12-19 | Stop reason: SDUPTHER

## 2023-07-28 RX ORDER — GUSELKUMAB 100 MG/ML
INJECTION SUBCUTANEOUS
COMMUNITY
Start: 2023-06-22

## 2023-07-28 NOTE — PROGRESS NOTES
Subjective:    Patient ID:  William Winslow is a 52 y.o. male who presents for Non-rheumatic mitral regurgitation, Hypertension, and Risk Factor Management For Atherosclerosis        HPI    DISCUSSED LABS AND GOALS CMP OK BLOOD GLUCOSE 160, HBA1C 6%, VAPING, STRESS WITH WORK, SEE ROS  Past Medical History:   Diagnosis Date    Cervical herniated disc     Insomnia     Psoriasis     Sleep apnea     Type 2 diabetes mellitus without complication 10/6/2017     Past Surgical History:   Procedure Laterality Date    MOUTH SURGERY      NASAL SEPTUM SURGERY       Family History   Problem Relation Age of Onset    Heart disease Father      Social History     Socioeconomic History    Marital status:    Tobacco Use    Smoking status: Former     Packs/day: 1.00     Types: Cigarettes     Quit date: 2022     Years since quittin.2    Smokeless tobacco: Never    Tobacco comments:     Currenlty vaping    Substance and Sexual Activity    Alcohol use: No    Drug use: No       Review of patient's allergies indicates:  No Known Allergies    Current Outpatient Medications:     ascorbic acid, vitamin C, (VITAMIN C) 1000 MG tablet, Take 1,000 mg by mouth once daily., Disp: , Rfl:     aspirin 81 MG Chew, Take 81 mg by mouth once daily., Disp: , Rfl:     clobetasol 0.05% (TEMOVATE) 0.05 % Oint, APPLY TO THE SKIN TWICE DAILY. APPLY TO ELBOWS, Disp: , Rfl:     COQ10, LIPOSOMAL UBIQUINOL, ORAL, Take 1 capsule by mouth once daily., Disp: , Rfl:     DUOBRII 0.01-0.045 % Lotn, Apply topically., Disp: , Rfl:     EScitalopram oxalate (LEXAPRO) 20 MG tablet, Take 1 tablet (20 mg total) by mouth once daily., Disp: 90 tablet, Rfl: 4    metFORMIN (GLUCOPHAGE) 1000 MG tablet, Take 1 tablet (1,000 mg total) by mouth daily with breakfast., Disp: 90 tablet, Rfl: 4    olmesartan (BENICAR) 20 MG tablet, Take 1 tablet (20 mg total) by mouth once daily., Disp: 90 tablet, Rfl: 4    OMEGA-3-DHA-EPA-DPA ORAL, Take by mouth 2 (two) times a day., Disp:  ", Rfl:     OTEZLA 30 mg Tab, Take by mouth 2 (two) times daily. , Disp: , Rfl:     pantoprazole (PROTONIX) 40 MG tablet, Take 1 tablet (40 mg total) by mouth once daily., Disp: 90 tablet, Rfl: 4    vitamin D 1000 units Tab, Take 185 mg by mouth once daily., Disp: , Rfl:     rosuvastatin (CRESTOR) 20 MG tablet, Take 1 tablet (20 mg total) by mouth every evening., Disp: 90 tablet, Rfl: 1    TREMFYA 100 mg/mL AtIn, Inject into the skin., Disp: , Rfl:     Review of Systems   Constitutional: Positive for weight gain. Negative for chills, diaphoresis, fever, malaise/fatigue and night sweats.   HENT:  Negative for congestion and nosebleeds.    Eyes: Negative.  Negative for blurred vision and visual disturbance.   Cardiovascular:  Negative for chest pain, claudication, cyanosis, dyspnea on exertion, irregular heartbeat, leg swelling, near-syncope, orthopnea, palpitations, paroxysmal nocturnal dyspnea and syncope.   Respiratory:  Negative for cough, hemoptysis, shortness of breath and wheezing.    Hematologic/Lymphatic: Negative for adenopathy. Does not bruise/bleed easily.   Skin:  Negative for color change and rash.        PSORIASIS   Musculoskeletal:  Negative for back pain and falls.   Gastrointestinal:  Negative for abdominal pain, change in bowel habit, dysphagia, jaundice, melena and nausea.   Genitourinary:  Negative for dysuria and flank pain.   Neurological:  Negative for brief paralysis, focal weakness, headaches, light-headedness, loss of balance, paresthesias and weakness.   Psychiatric/Behavioral:  Negative for altered mental status and depression.    Allergic/Immunologic: Negative for persistent infections.      Objective:      Vitals:    07/28/23 0957   BP: 125/77   Pulse: 73   Weight: 108.5 kg (239 lb 3.2 oz)   Height: 5' 10" (1.778 m)   PainSc: 0-No pain     Body mass index is 34.32 kg/m².    Physical Exam  Constitutional:       Appearance: He is well-developed. He is obese.   HENT:      Head: Normocephalic " and atraumatic.   Eyes:      General: No scleral icterus.     Extraocular Movements: Extraocular movements intact.      Conjunctiva/sclera: Conjunctivae normal.      Pupils: Pupils are equal, round, and reactive to light.   Neck:      Vascular: Normal carotid pulses. No carotid bruit, hepatojugular reflux or JVD.   Cardiovascular:      Rate and Rhythm: Normal rate and regular rhythm. No extrasystoles are present.     Pulses:           Carotid pulses are 2+ on the right side and 2+ on the left side.       Radial pulses are 2+ on the right side and 2+ on the left side.        Femoral pulses are 2+ on the right side and 2+ on the left side.       Posterior tibial pulses are 2+ on the right side and 2+ on the left side.      Heart sounds: Murmur heard.   Systolic murmur is present with a grade of 1/6 at the lower left sternal border and apex.     No friction rub. No gallop. No S4 sounds.   Pulmonary:      Effort: Pulmonary effort is normal.      Breath sounds: Normal breath sounds and air entry. No rhonchi or rales.   Abdominal:      Palpations: Abdomen is soft.      Tenderness: There is no abdominal tenderness.   Musculoskeletal:         General: Normal range of motion.      Cervical back: Neck supple.      Right lower leg: No edema.      Left lower leg: No edema.   Skin:     General: Skin is warm and dry.      Capillary Refill: Capillary refill takes less than 2 seconds.   Neurological:      General: No focal deficit present.      Mental Status: He is alert and oriented to person, place, and time.      Cranial Nerves: No cranial nerve deficit.      Motor: No tremor.   Psychiatric:         Mood and Affect: Mood normal.         Speech: Speech normal.         Behavior: Behavior normal.               ..    Chemistry        Component Value Date/Time     07/27/2023 0711    K 4.5 07/27/2023 0711     07/27/2023 0711    CO2 26 07/27/2023 0711    BUN 20 07/27/2023 0711    CREATININE 0.80 07/27/2023 0711      (H) 07/27/2023 0711        Component Value Date/Time    CALCIUM 9.2 07/27/2023 0711    ALKPHOS 69 07/27/2023 0711    AST 35 07/27/2023 0711    AST 33 12/02/2015 0716    ALT 48 07/27/2023 0711    BILITOT 0.3 07/27/2023 0711    ESTGFRAFRICA >60 07/25/2022 0715    EGFRNONAA >60 07/25/2022 0715            ..  Lab Results   Component Value Date    CHOL 185 11/29/2022    CHOL 131 07/25/2022    CHOL 147 12/10/2021    CHOL 147 12/10/2021     Lab Results   Component Value Date    HDL 42 11/29/2022    HDL 37 (L) 07/25/2022    HDL 29 (L) 12/10/2021    HDL 29 (L) 12/10/2021     Lab Results   Component Value Date    LDLCALC 105.4 11/29/2022    LDLCALC 65.4 07/25/2022    LDLCALC 89.0 12/10/2021    LDLCALC 89.0 12/10/2021     Lab Results   Component Value Date    TRIG 188 (H) 11/29/2022    TRIG 143 07/25/2022    TRIG 145 12/10/2021    TRIG 145 12/10/2021     Lab Results   Component Value Date    CHOLHDL 22.7 11/29/2022    CHOLHDL 28.2 07/25/2022    CHOLHDL 19.7 (L) 12/10/2021    CHOLHDL 19.7 (L) 12/10/2021     ..  Lab Results   Component Value Date    WBC 8.26 03/25/2021    HGB 15.4 03/25/2021    HCT 47.3 03/25/2021    MCV 93 03/25/2021     03/25/2021       Test(s) Reviewed  I have reviewed the following in detail:  [] Stress test   [] Angiography   [] Echocardiogram   [x] Labs   [] Other:       Assessment:         ICD-10-CM ICD-9-CM   1. Non-rheumatic mitral regurgitation  I34.0 424.0   2. Dyslipidemia (high LDL; low HDL)  E78.5 272.4   3. Vaping nicotine dependence, non-tobacco product  F17.200 305.1   4. Essential hypertension  I10 401.9   5. Obesity, Class I, BMI 30-34.9  E66.9 278.00   6. Type 2 diabetes mellitus without complication, without long-term current use of insulin  E11.9 250.00     Problem List Items Addressed This Visit          Cardiac/Vascular    Essential hypertension    Relevant Orders    Comprehensive Metabolic Panel    Non-rheumatic mitral regurgitation - Primary    Dyslipidemia (high LDL; low HDL)     Relevant Medications    rosuvastatin (CRESTOR) 20 MG tablet    Other Relevant Orders    Comprehensive Metabolic Panel    Lipid Panel       Endocrine    Type 2 diabetes mellitus without complication    Relevant Orders    Hemoglobin A1C    Microalbumin/creatinine urine ratio    Obesity, Class I, BMI 30-34.9       Other    Vaping nicotine dependence, non-tobacco product        Plan:         AVOID VAPING, ALL CV CLINICALLY STABLE, NO ANGINA, NO HF, NO TIA, NO CLINICAL ARRHYTHMIA,CONTINUE CURRENT MEDS, EDUCATION, DIET, EXERCISE , WEIGHT LOSS, RTC IN 6 MO WITH LABS  Non-rheumatic mitral regurgitation    Dyslipidemia (high LDL; low HDL)  -     Comprehensive Metabolic Panel; Future; Expected date: 01/28/2024  -     Lipid Panel; Future; Expected date: 01/28/2024  -     rosuvastatin (CRESTOR) 20 MG tablet; Take 1 tablet (20 mg total) by mouth every evening.  Dispense: 90 tablet; Refill: 1    Vaping nicotine dependence, non-tobacco product    Essential hypertension  -     Comprehensive Metabolic Panel; Future; Expected date: 01/28/2024    Obesity, Class I, BMI 30-34.9    Type 2 diabetes mellitus without complication, without long-term current use of insulin  -     Hemoglobin A1C; Future; Expected date: 01/28/2024  -     Microalbumin/creatinine urine ratio; Future; Expected date: 01/28/2024    RTC Low level/low impact aerobic exercise 5x's/wk. Heart healthy diet and risk factor modification.    See labs and med orders.    Aerobic exercise 5x's/wk. Heart healthy diet and risk factor modification.    See labs and med orders.

## 2023-12-19 PROBLEM — I20.9 ANGINA PECTORIS: Status: ACTIVE | Noted: 2023-12-19

## 2023-12-19 PROBLEM — J20.9 COPD WITH ACUTE BRONCHITIS: Status: ACTIVE | Noted: 2023-12-19

## 2023-12-19 PROBLEM — J44.0 COPD WITH ACUTE BRONCHITIS: Status: ACTIVE | Noted: 2023-12-19

## 2023-12-19 PROBLEM — E66.01 SEVERE OBESITY (BMI 35.0-39.9) WITH COMORBIDITY: Status: ACTIVE | Noted: 2023-12-19

## 2024-03-20 PROBLEM — E66.09 CLASS 1 OBESITY DUE TO EXCESS CALORIES WITH SERIOUS COMORBIDITY AND BODY MASS INDEX (BMI) OF 33.0 TO 33.9 IN ADULT: Status: ACTIVE | Noted: 2024-03-20

## 2024-03-20 PROBLEM — L40.50 ARTHROPATHIC PSORIASIS, UNSPECIFIED: Status: ACTIVE | Noted: 2024-03-20

## 2024-03-20 PROBLEM — E66.01 SEVERE OBESITY (BMI 35.0-39.9) WITH COMORBIDITY: Status: RESOLVED | Noted: 2023-12-19 | Resolved: 2024-03-20

## 2024-03-25 PROBLEM — Z00.00 ANNUAL PHYSICAL EXAM: Status: RESOLVED | Noted: 2022-12-19 | Resolved: 2024-03-25
